# Patient Record
Sex: FEMALE | Race: BLACK OR AFRICAN AMERICAN | NOT HISPANIC OR LATINO | Employment: STUDENT | URBAN - METROPOLITAN AREA
[De-identification: names, ages, dates, MRNs, and addresses within clinical notes are randomized per-mention and may not be internally consistent; named-entity substitution may affect disease eponyms.]

---

## 2022-02-04 ENCOUNTER — HOSPITAL ENCOUNTER (EMERGENCY)
Facility: CLINIC | Age: 22
Discharge: LEFT BEFORE DISCHARGE | End: 2022-02-04

## 2022-02-04 VITALS
OXYGEN SATURATION: 99 % | WEIGHT: 158 LBS | SYSTOLIC BLOOD PRESSURE: 123 MMHG | BODY MASS INDEX: 29.83 KG/M2 | TEMPERATURE: 97.4 F | HEIGHT: 61 IN | RESPIRATION RATE: 16 BRPM | DIASTOLIC BLOOD PRESSURE: 80 MMHG | HEART RATE: 87 BPM

## 2022-02-04 ASSESSMENT — MIFFLIN-ST. JEOR: SCORE: 1424.06

## 2022-02-04 NOTE — ED TRIAGE NOTES
Before arriving home post procedure patient has had post surgical bleeding.  All 4 were removed.  Was done at the medical/surgical building in  [place].  [facility]  Was given Vicodin for pain.  Coming in for dizziness and continued bleeding.

## 2022-12-17 ENCOUNTER — HOSPITAL ENCOUNTER (EMERGENCY)
Facility: CLINIC | Age: 22
Discharge: HOME | End: 2022-12-17
Admitting: PHYSICIAN ASSISTANT
Payer: COMMERCIAL

## 2022-12-17 VITALS
OXYGEN SATURATION: 99 % | TEMPERATURE: 99.5 F | WEIGHT: 158 LBS | SYSTOLIC BLOOD PRESSURE: 119 MMHG | HEART RATE: 104 BPM | BODY MASS INDEX: 29.85 KG/M2 | DIASTOLIC BLOOD PRESSURE: 70 MMHG | RESPIRATION RATE: 18 BRPM

## 2022-12-17 DIAGNOSIS — U07.1 INFECTION DUE TO 2019 NOVEL CORONAVIRUS: ICD-10-CM

## 2022-12-17 LAB
FLUAV RNA SPEC QL NAA+PROBE: NEGATIVE
FLUBV RNA RESP QL NAA+PROBE: NEGATIVE
RSV RNA SPEC NAA+PROBE: NEGATIVE
SARS-COV-2 RNA RESP QL NAA+PROBE: POSITIVE

## 2022-12-17 PROCEDURE — C9803 HOPD COVID-19 SPEC COLLECT: HCPCS

## 2022-12-17 PROCEDURE — 87637 SARSCOV2&INF A&B&RSV AMP PRB: CPT | Performed by: EMERGENCY MEDICINE

## 2022-12-17 PROCEDURE — 99283 EMERGENCY DEPT VISIT LOW MDM: CPT | Mod: CS

## 2022-12-17 ASSESSMENT — ENCOUNTER SYMPTOMS
CHILLS: 1
DIARRHEA: 0
FEVER: 1
SINUS PAIN: 1
VOMITING: 0
SINUS PRESSURE: 1
SHORTNESS OF BREATH: 0
SORE THROAT: 1
MYALGIAS: 1
NAUSEA: 0
COUGH: 1
FATIGUE: 1

## 2022-12-17 ASSESSMENT — ACTIVITIES OF DAILY LIVING (ADL): ADLS_ACUITY_SCORE: 33

## 2022-12-17 NOTE — ED TRIAGE NOTES
Pt arrives with flu symptoms since yesterday. Sinus pain, low grade fever, body aches, and cough.     Triage Assessment     Row Name 12/17/22 1011       Triage Assessment (Adult)    Airway WDL WDL       Respiratory WDL    Respiratory WDL cough    Cough Frequency frequent    Cough Type loose       Skin Circulation/Temperature WDL    Skin Circulation/Temperature WDL WDL       Cardiac WDL    Cardiac WDL WDL       Peripheral/Neurovascular WDL    Peripheral Neurovascular WDL WDL       Cognitive/Neuro/Behavioral WDL    Cognitive/Neuro/Behavioral WDL WDL

## 2022-12-17 NOTE — ED PROVIDER NOTES
EMERGENCY DEPARTMENT ENCOUNTER      NAME: Sherri Daley  AGE: 21 year old female  YOB: 2000  MRN: [id number]  EVALUATION DATE & TIME: 12/17/2022 11:04 AM    [facility]    ED PROVIDER: Adeola Rivas PA-C      Chief Complaint   Patient presents with     Flu Symptoms         FINAL IMPRESSION:  1. Infection due to 2019 novel coronavirus          MEDICAL DECISION MAKING:    Pertinent Labs & Imaging studies reviewed. (See chart for details)  21 year old female with a pertinent history of alpha thalassemia, RAY, asthma presents to the Emergency Department for evaluation of cough, sinus congestion and pressure, low-grade fevers, body aches x 1 day.     Vitals reviewed and notable for low grade fever with expected tachycardia. Patient is non-toxic appearing and in no acute distress. On physical exam, lungs are CTAB, no wheezing. Nasal congestion. Differential diagnosis includes viral respiratory infection including COVID, influenza or other viral syndrome, pneumonia, or reactive airway disease.     COVID positive. Patient is comfortable on room air, saturating at 97% with no respiratory distress. CXR deferred at this time. Stressed the importance of self quarantining at home to avoid potential spread of infection and written instructions provided. Patient expresses understanding and discharged home in stable condition after discussing return precautions.       Medical Decision Making    History:    Supplemental history from: Documented in HPI, if applicable    External Record(s) reviewed: Documented in HPI, if applicable.    Work Up:    Chart documentation includes differential considered and any EKGs or imaging interpreted by provider.    In additional to work up documented, I considered the following work up: See chart documentation, if applicable.    External consultation:    Discussion of management with another provider: See chart documentation, if applicable    Complicating  factors:    Care impacted by chronic illness: Chronic Lung Disease- asthma    Care affected by social determinants of health: N/A    Disposition considerations: Discharge. No recommendations on prescription strength medication(s). N/A.      0 minutes of critical care time     ED COURSE:  11:14 AM I met with the patient, obtained history, performed an initial exam, and discussed options and plan for diagnostics and treatment here in the ED.  11:30 AM Patient discharged after being provided with extensive anticipatory guidance and given return precautions, importance of PCP follow-up emphasized.    At the conclusion of the encounter I discussed the results of all of the tests and the disposition. The questions were answered. The patient acknowledged understanding and was agreeable with the care plan.     MEDICATIONS GIVEN IN THE EMERGENCY:  Medications - No data to display    NEW PRESCRIPTIONS STARTED AT TODAY'S ER VISIT  New Prescriptions    No medications on file            =================================================================    HPI:    Patient information was obtained from: Patient    Use of Interpretor: N/A      Sherri Daley is a 21 year old female with a pertinent history of alpha thalassemia, RAY, asthma who presents to this ED via private vehicle for evaluation of flu symptoms.    Patient presenting with 1 day of cough, sinus congestion and pressure, low-grade fevers, body aches.  She denies any vomiting, diarrhea, chest pain, shortness of breath.  She has not taken anything for her symptoms.  She is fully vaccinated for COVID-19 however not influenza.  Her mother is sick with similar symptoms.    REVIEW OF SYSTEMS:  Review of Systems   Constitutional: Positive for chills, fatigue and fever.   HENT: Positive for congestion, sinus pressure, sinus pain and sore throat.    Respiratory: Positive for cough. Negative for shortness of breath.    Cardiovascular: Negative for chest pain.   Gastrointestinal:  Negative for diarrhea, nausea and vomiting.   Musculoskeletal: Positive for myalgias.   All other systems reviewed and are negative.      PAST MEDICAL HISTORY:  No past medical history on file.    PAST SURGICAL HISTORY:  No past surgical history on file.        CURRENT MEDICATIONS:    No current facility-administered medications for this encounter.  No current outpatient medications on file.      ALLERGIES:  Allergies   Allergen Reactions     Lavender Oil Itching     Lilac     Lactose Other (See Comments)     Stomach upset, diarrhea  Stomach upset, diarrhea         FAMILY HISTORY:  No family history on file.    SOCIAL HISTORY:   Social History     Socioeconomic History     Marital status: Single       VITALS:  Patient Vitals for the past 24 hrs:   BP Temp Temp src Pulse Resp SpO2 Weight   12/17/22 1009 130/72 100.1  F (37.8  C) Oral 116 19 97 % 71.7 kg (158 lb)       PHYSICAL EXAM  Constitutional: Well developed, Well nourished, NAD  HENT: Normocephalic, Atraumatic, Bilateral external ears normal, nasal congestion.  Neck: Supple, No stridor.  Eyes: Conjunctiva normal, No discharge.   Respiratory: Normal breath sounds, No respiratory distress, No wheezing, Speaks full sentences easily. No cough.  Cardiovascular: Normal heart rate, Regular rhythm, No murmurs, No rubs, No gallops. Chest wall nontender.  GI: non-distended.  Musculoskeletal: No edema. No cyanosis, No clubbing. Good range of motion in all major joints.   Integument: Warm, Dry, No erythema, No rash. No petechiae.  Neurologic: Alert & oriented x 3, Normal motor function, Normal sensory function, No focal deficits noted. Normal gait.  Psychiatric: Affect normal, Judgment normal, Mood normal. Cooperative.    LAB:  All pertinent labs reviewed and interpreted.  Recent Results (from the past 24 hour(s))   Symptomatic Influenza A/B & SARS-CoV2 (COVID-19) Virus PCR Multiplex Nasopharyngeal    Collection Time: 12/17/22 10:14 AM    Specimen: Nasopharyngeal; Swab    Result Value Ref Range    Influenza A PCR Negative Negative    Influenza B PCR Negative Negative    RSV PCR Negative Negative    SARS CoV2 PCR Positive (A) Negative         Adeola Rivas PA-C  Emergency Medicine  [facility]  12/17/2022     Adeola Rivas PA-C  12/17/22 1133

## 2022-12-22 ENCOUNTER — HOSPITAL ENCOUNTER (EMERGENCY)
Facility: CLINIC | Age: 22
Discharge: HOME | End: 2022-12-22
Attending: EMERGENCY MEDICINE | Admitting: EMERGENCY MEDICINE
Payer: COMMERCIAL

## 2022-12-22 VITALS
WEIGHT: 165 LBS | TEMPERATURE: 97 F | HEART RATE: 77 BPM | RESPIRATION RATE: 16 BRPM | OXYGEN SATURATION: 98 % | DIASTOLIC BLOOD PRESSURE: 76 MMHG | BODY MASS INDEX: 31.18 KG/M2 | SYSTOLIC BLOOD PRESSURE: 126 MMHG

## 2022-12-22 DIAGNOSIS — R23.3 EASY BRUISING: ICD-10-CM

## 2022-12-22 DIAGNOSIS — R04.0 EPISTAXIS: ICD-10-CM

## 2022-12-22 PROCEDURE — 30903 CONTROL OF NOSEBLEED: CPT | Mod: 50

## 2022-12-22 PROCEDURE — 99284 EMERGENCY DEPT VISIT MOD MDM: CPT | Mod: 25

## 2022-12-22 ASSESSMENT — ENCOUNTER SYMPTOMS
CHILLS: 0
DIAPHORESIS: 0
ABDOMINAL PAIN: 0
LIGHT-HEADEDNESS: 1
COUGH: 1
FEVER: 0
NAUSEA: 0

## 2022-12-22 ASSESSMENT — ACTIVITIES OF DAILY LIVING (ADL): ADLS_ACUITY_SCORE: 33

## 2022-12-22 NOTE — ED TRIAGE NOTES
Pt arrives to ED with c/o ongoing bloody nose since yesterday. Pt states it stopped yesterday and now started up again. Bleeding from both sides with clots. Pt tested positive for Covid on 12/17. Hx of chronic anemia.      Triage Assessment     Row Name 12/22/22 0805       Triage Assessment (Adult)    Airway WDL WDL       Respiratory WDL    Respiratory WDL X;cough    Cough Frequency frequent       Skin Circulation/Temperature WDL    Skin Circulation/Temperature WDL WDL       Cardiac WDL    Cardiac WDL WDL       Cognitive/Neuro/Behavioral WDL    Cognitive/Neuro/Behavioral WDL WDL

## 2022-12-22 NOTE — ED PROVIDER NOTES
EMERGENCY DEPARTMENT ENCOUNTER      NAME: Sherri Daley  AGE: 21 year old female  YOB: 2000  MRN: [id number]  EVALUATION DATE & TIME: 12/22/2022  8:09 AM    [facility]    ED PROVIDER: Alisson Thurman MD      Chief Complaint   Patient presents with     Epistaxis         FINAL IMPRESSION:  1. Epistaxis          ED COURSE & MEDICAL DECISION MAKING:    Pertinent Labs & Imaging studies reviewed. (See chart for details)  21 year old female presents to the Emergency Department for evaluation after an episode of epistaxis. Patient tested positive for COVID on December 17.  She has had continued cough and congestion.  Yesterday she had an episode of epistaxis which spontaneously resolved.  This morning around 730 she started to have a rebleed, this time it came from her bilateral nares and not just the right side.  She states that she previously had a significant nosebleed about 12 years ago that required chemical cautery.  She is not anticoagulated but states that she recently had an unprovoked bruise on the back of her thigh.  She was seen for this and it was recommended that she follow-up with her primary care provider for further work-up of this.  She states she does not have a primary care provider.  On exam, there does appear to be source of bleeding identified on both sides along the medial septum.  Chemical cautery was performed with silver nitrate on both sides.  Patient was observed for 45 minutes with no rebleed and was discharged home with precautions and supportive management status post epistaxis.    8:11 AM I met with the patient, obtained history, performed an initial exam, and discussed options and plan for diagnostics and treatment here in the ED. I saw the patient wearing an eye shield, surgical mask, gown, and gloves.   8:30 AM Conducted epistaxis management.  9:29 AM Re-evaulated patient and bleeding has completely stopped. Patient asked for a referral for a PCP for  further work-up of easy bruising. We discussed the plan for discharge and the patient is agreeable. Reviewed supportive cares, symptomatic treatment, outpatient follow up, and reasons to return to the Emergency Department. Patient to be discharged by ED RN.     At the conclusion of the encounter I discussed the results of all of the tests and the disposition. The questions were answered. The patient or family acknowledged understanding and was agreeable with the care plan.         MEDICATIONS GIVEN IN THE EMERGENCY:  Medications   phenylephrine (ELMIRA-SYNEPHRINE) 0.5 % spray 1 drop (1 drop Both Nostrils Not Given 12/22/22 0838)       NEW PRESCRIPTIONS STARTED AT TODAY'S ER VISIT  New Prescriptions    No medications on file          =================================================================    HPI    Patient information was obtained from: Patient     Use of : N/A         Sherri Daley is a 21 year old female with a pertinent history of alpha thalassemia, RAY, asthma who presents to this ED via wheelchair for evaluation of epistaxis.     Yesterday night (12/21/2022), patient endorsed epistaxis with blood coming out of her right nostril. Epistaxis stopped spontaneously. This morning at 7:30 AM, epistaxis started again but this time, blood came out from both nostrils. She has not been able to stop the bleeding and additionally notes that she is able to feel the blood in the back of her throat. She denies abdominal pain or nausea. Patient denies use of blood thinners or any other medications.     Of note, patient tested positive for COVID-19 on 12/17/2022. At present, patient still feels continued congestion, cough and lightheadedness. Patient denies fever, chills, diaphoresis, or any other complaints at this time.     REVIEW OF SYSTEMS   Review of Systems   Constitutional: Negative for chills, diaphoresis and fever.   HENT: Positive for congestion and nosebleeds (both nostrils) .    Respiratory: Positive  for cough.    Gastrointestinal: Negative for abdominal pain and nausea.   Neurological: Positive for light-headedness.   All other systems reviewed and are negative.       PAST MEDICAL HISTORY:  History reviewed. No pertinent past medical history.    PAST SURGICAL HISTORY:  History reviewed. No pertinent surgical history.        CURRENT MEDICATIONS:    No current outpatient medications on file.      ALLERGIES:  Allergies   Allergen Reactions     Lavender Oil Itching     Lilac     Lactose Other (See Comments)     Stomach upset, diarrhea  Stomach upset, diarrhea         FAMILY HISTORY:  History reviewed. No pertinent family history.    SOCIAL HISTORY:   Social History     Socioeconomic History     Marital status: Single       VITALS:  BP (!) 141/90   Pulse 78   Temp 97  F (36.1  C) (Temporal)   Resp 16   Wt 74.8 kg (165 lb)   LMP 12/07/2022   SpO2 99%   BMI 31.18 kg/m      PHYSICAL EXAM    Constitutional: Well developed, Well nourished, NAD  HENT: Normocephalic, Atraumatic, Bilateral external ears normal, Oropharynx normal, mucous membranes moist, Area of bleeding identified along the bilateral medial septum anteriorly with no active bleeding.  Neck- Normal range of motion, No tenderness, Supple, No stridor.  Eyes: PERRL, EOMI, Conjunctiva normal, No discharge.   Respiratory: Normal breath sounds, No respiratory distress  Cardiovascular: Normal heart rate, Regular rhythm  Musculoskeletal: No edema. Good range of motion in all major joints. No tenderness to palpation or major deformities noted.   Integument: Warm, Dry, No erythema, No rash  Neurologic: Alert & oriented x 3, Normal motor function, Normal sensory function, No focal deficits noted. Normal gait.   Psychiatric: Affect normal, Judgment normal, Mood normal.      LAB:  All pertinent labs reviewed and interpreted.       RADIOLOGY:  Reviewed all pertinent imaging. Please see official radiology report.  No orders to display       EKG:     None    PROCEDURES:   PROCEDURE: Epistaxis Management   INDICATIONS: Failure of epistaxis control with non-invasive management techniques.   PROCEDURE PROVIDER:  Dr. Thurman   SITE: ANTERIOR--right nares   MEDICATION: Silver nitrate   NOTE: Anterior Source:  The area was evaluated and cleared with nasal suction to locate source of bleeding.  The bleeding location was managed with silver nitrate.Following treatment the patient was observed and no significant bleeding was noted to recur.     COMPLICATIONS:  None       PROCEDURE: Epistaxis Management   INDICATIONS: Failure of epistaxis control with non-invasive management techniques.   PROCEDURE PROVIDER:  Dr. Thurman   SITE: ANTERIOR--left nares   MEDICATION: Silver nitrate   NOTE: Anterior Source:  The area was evaluated and cleared with nasal suction to locate source of bleeding.  The bleeding location was managed with silver nitrate.Following treatment the patient was observed and no significant bleeding was noted to recur.     COMPLICATIONS:  None       I, Debbie Lyman, am serving as a scribe to document services personally performed by Alisson Thurman, based on my observation and the provider's statements to me. I, Alisson Thurman MD, attest that Debbie Lyman is acting in a scribe capacity, has observed my performance of the services and has documented them in accordance with my direction.    Alisson Thurman MD  Emergency Medicine  [facility] EMERGENCY ROOM  [address] [address] [phone]     Alisson Thurman MD  12/22/22 7848

## 2023-01-18 ENCOUNTER — OFFICE VISIT (OUTPATIENT)
Dept: INTERNAL MEDICINE | Facility: CLINIC | Age: 23
End: 2023-01-18
Attending: EMERGENCY MEDICINE
Payer: COMMERCIAL

## 2023-01-18 VITALS
BODY MASS INDEX: 34.18 KG/M2 | WEIGHT: 174.1 LBS | SYSTOLIC BLOOD PRESSURE: 122 MMHG | DIASTOLIC BLOOD PRESSURE: 84 MMHG | OXYGEN SATURATION: 100 % | HEIGHT: 60 IN | HEART RATE: 80 BPM

## 2023-01-18 DIAGNOSIS — R04.0 EPISTAXIS: ICD-10-CM

## 2023-01-18 DIAGNOSIS — Z11.4 SCREENING FOR HIV (HUMAN IMMUNODEFICIENCY VIRUS): ICD-10-CM

## 2023-01-18 DIAGNOSIS — R23.3 EASY BRUISING: ICD-10-CM

## 2023-01-18 DIAGNOSIS — E61.1 IRON DEFICIENCY: Primary | ICD-10-CM

## 2023-01-18 DIAGNOSIS — Z23 ENCOUNTER FOR VACCINATION: ICD-10-CM

## 2023-01-18 DIAGNOSIS — E04.9 GOITER: ICD-10-CM

## 2023-01-18 DIAGNOSIS — Z11.59 NEED FOR HEPATITIS C SCREENING TEST: ICD-10-CM

## 2023-01-18 DIAGNOSIS — H91.93 HEARING PROBLEM OF BOTH EARS: ICD-10-CM

## 2023-01-18 PROBLEM — R71.8 MICROCYTOSIS: Status: ACTIVE | Noted: 2020-10-27

## 2023-01-18 PROBLEM — D57.3 SICKLE CELL TRAIT (H): Status: ACTIVE | Noted: 2020-12-04

## 2023-01-18 PROBLEM — D50.0 ANEMIA DUE TO CHRONIC BLOOD LOSS: Status: ACTIVE | Noted: 2020-12-21

## 2023-01-18 PROBLEM — R22.1 LOCALIZED SWELLING, MASS OR LUMP OF NECK: Status: ACTIVE | Noted: 2020-10-27

## 2023-01-18 PROBLEM — J45.30 MILD PERSISTENT ASTHMA WITHOUT COMPLICATION: Status: ACTIVE | Noted: 2020-10-23

## 2023-01-18 PROBLEM — D56.0 ALPHA-THALASSEMIA (H): Status: ACTIVE | Noted: 2020-12-04

## 2023-01-18 LAB
APTT PPP: 32 SECONDS (ref 22–38)
BASOPHILS # BLD AUTO: 0.1 10E3/UL (ref 0–0.2)
BASOPHILS NFR BLD AUTO: 1 %
CLOSURE TME COLL+EPINEP BLD: <60 SECONDS
EOSINOPHIL # BLD AUTO: 0.1 10E3/UL (ref 0–0.7)
EOSINOPHIL NFR BLD AUTO: 1 %
ERYTHROCYTE [DISTWIDTH] IN BLOOD BY AUTOMATED COUNT: 15.9 % (ref 10–15)
FERRITIN SERPL-MCNC: 24 NG/ML (ref 6–175)
HCT VFR BLD AUTO: 41.1 % (ref 35–47)
HGB BLD-MCNC: 12.9 G/DL (ref 11.7–15.7)
IMM GRANULOCYTES # BLD: 0 10E3/UL
IMM GRANULOCYTES NFR BLD: 0 %
INR PPP: 1.18 (ref 0.85–1.15)
IRON BINDING CAPACITY (ROCHE): 362 UG/DL (ref 240–430)
IRON SATN MFR SERPL: 28 % (ref 15–46)
IRON SERPL-MCNC: 103 UG/DL (ref 37–145)
LYMPHOCYTES # BLD AUTO: 2 10E3/UL (ref 0.8–5.3)
LYMPHOCYTES NFR BLD AUTO: 30 %
MCH RBC QN AUTO: 21.3 PG (ref 26.5–33)
MCHC RBC AUTO-ENTMCNC: 31.4 G/DL (ref 31.5–36.5)
MCV RBC AUTO: 68 FL (ref 78–100)
MONOCYTES # BLD AUTO: 0.5 10E3/UL (ref 0–1.3)
MONOCYTES NFR BLD AUTO: 8 %
NEUTROPHILS # BLD AUTO: 4.1 10E3/UL (ref 1.6–8.3)
NEUTROPHILS NFR BLD AUTO: 61 %
PLATELET # BLD AUTO: 282 10E3/UL (ref 150–450)
RBC # BLD AUTO: 6.05 10E6/UL (ref 3.8–5.2)
TSH SERPL DL<=0.005 MIU/L-ACNC: 2.09 UIU/ML (ref 0.3–4.2)
WBC # BLD AUTO: 6.8 10E3/UL (ref 4–11)

## 2023-01-18 PROCEDURE — 85025 COMPLETE CBC W/AUTO DIFF WBC: CPT | Performed by: INTERNAL MEDICINE

## 2023-01-18 PROCEDURE — 85246 CLOT FACTOR VIII VW ANTIGEN: CPT | Performed by: INTERNAL MEDICINE

## 2023-01-18 PROCEDURE — 87389 HIV-1 AG W/HIV-1&-2 AB AG IA: CPT | Performed by: INTERNAL MEDICINE

## 2023-01-18 PROCEDURE — 99203 OFFICE O/P NEW LOW 30 MIN: CPT | Mod: 25 | Performed by: INTERNAL MEDICINE

## 2023-01-18 PROCEDURE — 83550 IRON BINDING TEST: CPT | Performed by: INTERNAL MEDICINE

## 2023-01-18 PROCEDURE — 85240 CLOT FACTOR VIII AHG 1 STAGE: CPT | Performed by: INTERNAL MEDICINE

## 2023-01-18 PROCEDURE — 85245 CLOT FACTOR VIII VW RISTOCTN: CPT | Performed by: INTERNAL MEDICINE

## 2023-01-18 PROCEDURE — 85730 THROMBOPLASTIN TIME PARTIAL: CPT | Performed by: INTERNAL MEDICINE

## 2023-01-18 PROCEDURE — 90651 9VHPV VACCINE 2/3 DOSE IM: CPT | Performed by: INTERNAL MEDICINE

## 2023-01-18 PROCEDURE — 84443 ASSAY THYROID STIM HORMONE: CPT | Performed by: INTERNAL MEDICINE

## 2023-01-18 PROCEDURE — 90715 TDAP VACCINE 7 YRS/> IM: CPT | Performed by: INTERNAL MEDICINE

## 2023-01-18 PROCEDURE — 36415 COLL VENOUS BLD VENIPUNCTURE: CPT | Performed by: INTERNAL MEDICINE

## 2023-01-18 PROCEDURE — 82728 ASSAY OF FERRITIN: CPT | Performed by: INTERNAL MEDICINE

## 2023-01-18 PROCEDURE — 85576 BLOOD PLATELET AGGREGATION: CPT

## 2023-01-18 PROCEDURE — 85610 PROTHROMBIN TIME: CPT | Performed by: INTERNAL MEDICINE

## 2023-01-18 PROCEDURE — 90471 IMMUNIZATION ADMIN: CPT | Performed by: INTERNAL MEDICINE

## 2023-01-18 PROCEDURE — 83540 ASSAY OF IRON: CPT | Performed by: INTERNAL MEDICINE

## 2023-01-18 PROCEDURE — 86803 HEPATITIS C AB TEST: CPT | Performed by: INTERNAL MEDICINE

## 2023-01-18 PROCEDURE — 85390 FIBRINOLYSINS SCREEN I&R: CPT | Performed by: PATHOLOGY

## 2023-01-18 PROCEDURE — 90472 IMMUNIZATION ADMIN EACH ADD: CPT | Performed by: INTERNAL MEDICINE

## 2023-01-18 RX ORDER — BUDESONIDE AND FORMOTEROL FUMARATE DIHYDRATE 160; 4.5 UG/1; UG/1
AEROSOL RESPIRATORY (INHALATION)
COMMUNITY
Start: 2020-10-26

## 2023-01-18 ASSESSMENT — PATIENT HEALTH QUESTIONNAIRE - PHQ9
SUM OF ALL RESPONSES TO PHQ QUESTIONS 1-9: 19
SUM OF ALL RESPONSES TO PHQ QUESTIONS 1-9: 19
10. IF YOU CHECKED OFF ANY PROBLEMS, HOW DIFFICULT HAVE THESE PROBLEMS MADE IT FOR YOU TO DO YOUR WORK, TAKE CARE OF THINGS AT HOME, OR GET ALONG WITH OTHER PEOPLE: VERY DIFFICULT

## 2023-01-18 NOTE — LETTER
January 20, 2023      Sherri Daley  [address] [address]        Hi Sherri,     All of the special testing we did to look for a bleeding or clotting disorder were negative. Your iron levels are normal right now and you are no longer anemic. The low MCV seen on your CBC just shows us that you have the sickle cell trait and alpha thalassemia. Your thyroid function is normal. Your screening tests for HIV and hepatitis C were negative.     Dr. Ochoa       Resulted Orders   HIV Antigen Antibody Combo   Result Value Ref Range    HIV Antigen Antibody Combo Nonreactive Nonreactive      Comment:      HIV-1 p24 Ag & HIV-1/HIV-2 Ab Not Detected   Hepatitis C Screen Reflex to HCV RNA Quant and Genotype   Result Value Ref Range    Hepatitis C Antibody Nonreactive Nonreactive    Narrative    Assay performance characteristics have not been established for newborns, infants, and children.   INR   Result Value Ref Range    INR 1.18 (H) 0.85 - 1.15   Partial thromboplastin time   Result Value Ref Range    aPTT 32 22 - 38 Seconds   Platelet function closure with reflex   Result Value Ref Range    PFA-Col/Epi <60 <170 Seconds   Factor 8 assay   Result Value Ref Range    Factor 8 Assay 82 55 - 200 %   Von Willebrand antigen   Result Value Ref Range    von Willebrand Factor Antigen 108 50 - 200 %    Narrative    The presence of Rheumatoid Factor may produce an overestimation of the test result.   von Willebrand Factor Activity   Result Value Ref Range    von Willebrand Factor Activity 86 50 - 180 %   von Willebrand Interpretation   Result Value Ref Range    VONWILLEBRAND FACTOR INTERPRETATION       The von Willebrand factor antigen (VWF:Ag), von Willebrand factor activity (VWF:ACT), and Factor 8 levels are within normal limits.  The Factor 8 to VWF:Ag ratio and the VWF:Act to VWF:Ag ratio are within normal limits.    The diagnosis of von Willebrand disease can neither be established nor excluded on the basis of this  specimen.  If clinical suspicion is high for von Willebrand disease, recommend repeat testing in the first 3 days of the menstrual cycle, since the estrogen level influences the amount of circulating von Willebrand factor.  Note: Use of oral contraceptives and/or pregnancy could mask von Willebrand disease by elevating VWF:Ag and VWF:ACT levels to normal.  Family studies may also be helpful.    Jessy Wagner MD, PhD  [facility]                          Iron and iron binding capacity   Result Value Ref Range    Iron 103 37 - 145 ug/dL    Iron Binding Capacity 362 240 - 430 ug/dL    Iron Sat Index 28 15 - 46 %   Ferritin   Result Value Ref Range    Ferritin 24 6 - 175 ng/mL   TSH with free T4 reflex   Result Value Ref Range    TSH 2.09 0.30 - 4.20 uIU/mL   CBC with platelets and differential   Result Value Ref Range    WBC Count 6.8 4.0 - 11.0 10e3/uL    RBC Count 6.05 (H) 3.80 - 5.20 10e6/uL    Hemoglobin 12.9 11.7 - 15.7 g/dL    Hematocrit 41.1 35.0 - 47.0 %    MCV 68 (L) 78 - 100 fL    MCH 21.3 (L) 26.5 - 33.0 pg    MCHC 31.4 (L) 31.5 - 36.5 g/dL    RDW 15.9 (H) 10.0 - 15.0 %    Platelet Count 282 150 - 450 10e3/uL    % Neutrophils 61 %    % Lymphocytes 30 %    % Monocytes 8 %    % Eosinophils 1 %    % Basophils 1 %    % Immature Granulocytes 0 %    Absolute Neutrophils 4.1 1.6 - 8.3 10e3/uL    Absolute Lymphocytes 2.0 0.8 - 5.3 10e3/uL    Absolute Monocytes 0.5 0.0 - 1.3 10e3/uL    Absolute Eosinophils 0.1 0.0 - 0.7 10e3/uL    Absolute Basophils 0.1 0.0 - 0.2 10e3/uL    Absolute Immature Granulocytes 0.0 <=0.4 10e3/uL       If you have any questions or concerns, please call the clinic at the number listed above.       Sincerely,      Aisha Ohcoa MD

## 2023-01-18 NOTE — ASSESSMENT & PLAN NOTE
Patient reports issues with hearing over the last year.  She feels she has difficulty hearing while she is chewing and while in a crowd.  Sometimes has ringing in her years as well.  She tried Flonase but this has not been helpful.  On exam her ears and TMs are normal bilaterally, however nasal turbinates are quite erythematous and boggy.  - ENT referral  - Recommended daily flonase and saline rinses

## 2023-01-18 NOTE — ASSESSMENT & PLAN NOTE
Patient has had 2 episodes of epistaxis in her life that required chemical cautery, additionally is reporting easy mucocutaneous bleeding in her mouth.  Given this history we will evaluate for primary clotting disorders.  - CBC, PT, PTT, VWD screen

## 2023-01-18 NOTE — ASSESSMENT & PLAN NOTE
She has a known history of iron deficiency anemia, previously followed by hematology at [place].  Her last iron infusion was January 2021.  She does note fatigue coming back which is similar to what she felt when her iron was low previously.  She is not currently taking iron supplement.  - Check CBC, iron profile and ferritin   PROVIDER:[TOKEN:[[email or web address]]]

## 2023-01-18 NOTE — ASSESSMENT & PLAN NOTE
Patient reports tenderness on her lower neck, on exam has diffusely enlarged thyroid.  Additionally has been missing periods recently and is anxious.   - TSH  - Thyroid US

## 2023-01-18 NOTE — PROGRESS NOTES
Assessment & Plan   Problem List Items Addressed This Visit        Nervous and Auditory    Hearing problem of both ears     Patient reports issues with hearing over the last year.  She feels she has difficulty hearing while she is chewing and while in a crowd.  Sometimes has ringing in her years as well.  She tried Flonase but this has not been helpful.  On exam her ears and TMs are normal bilaterally, however nasal turbinates are quite erythematous and boggy.  - ENT referral  - Recommended daily flonase and saline rinses         Relevant Orders    Adult ENT  Referral       Respiratory    Epistaxis     Patient has had 2 episodes of epistaxis in her life that required chemical cautery, additionally is reporting easy mucocutaneous bleeding in her mouth.  Given this history we will evaluate for primary clotting disorders.  - CBC, PT, PTT, VWD screen          Relevant Orders    INR    Partial thromboplastin time    Platelet function closure with reflex (Completed)    Factor 8 assay    Von Willebrand antigen    von Willebrand Factor Activity    von Willebrand Interpretation       Digestive    Iron deficiency - Primary     She has a known history of iron deficiency anemia, previously followed by hematology at [place].  Her last iron infusion was January 2021.  She does note fatigue coming back which is similar to what she felt when her iron was low previously.  She is not currently taking iron supplement.  - Check CBC, iron profile and ferritin         Relevant Orders    CBC with platelets and differential (Completed)    Iron and iron binding capacity (Completed)    Ferritin (Completed)       Endocrine    Goiter     Patient reports tenderness on her lower neck, on exam has diffusely enlarged thyroid.  Additionally has been missing periods recently and is anxious.   - TSH  - Thyroid US         Relevant Orders    TSH with free T4 reflex (Completed)    US Thyroid   Other Visit Diagnoses     Easy bruising         Screening for HIV (human immunodeficiency virus)        Relevant Orders    HIV Antigen Antibody Combo    Need for hepatitis C screening test        Relevant Orders    Hepatitis C Screen Reflex to HCV RNA Quant and Genotype    Encounter for vaccination        Relevant Orders    TDAP VACCINE (Adacel, Boostrix) (Completed)    Human Papilloma Virus Vaccine (Gardasil 9) 3 Dose IM (Completed)            MED REC REQUIRED    Post Medication Reconciliation Status: discharge medications reconciled, continue medications without change  BMI:   Estimated body mass index is 34 kg/m  as calculated from the following:    Height as of this encounter: 1.524 m (5').    Weight as of this encounter: 79 kg (174 lb 1.6 oz).   Weight management plan: Discussed healthy diet and exercise guidelines    Depression Screening Follow Up    PHQ 1/18/2023   PHQ-9 Total Score 19   Q9: Thoughts of better off dead/self-harm past 2 weeks Several days   F/U: Thoughts of suicide or self-harm No   F/U: Safety concerns No       Return if symptoms worsen or fail to improve. - Patient is moving to [place] in 3 months.     Aisha Ochoa MD  [facility] TRAMAINEKATHY Polanco is a 22 year old presenting for the following health issues:  Follow Up ([facility] 12/22/2022. Now having neck soreness, heavy mucus feeling in the back of the throat, sharp tingling feeling in the throat area, check ears as well when she is chewing she cant hear people around her, cant hear people when she talking with people, was told she has fluid was given a ear drop before but this did not help and also a nasal spray) and clotting (Possible clotting disorder. Would like testing )    HPI     ED/UC Followup:  Facility:  [facility]  Date of visit: 12/22/2022  Reason for visit: Epistaxis  Current Status: one nose bleed since but got it to stop but now having different sx    Epistaxis/Bleeding:   One nose bleed when she was 9 that needed chemical  cautery. Then next one was just on 12/22, wouldn't stop at home, required cautery again in the ED. Will have bleeding with chips and other hard foods and pain. Had one hematoma on back of R thigh in early December as well, no preceding trauma. Some other easy bruising as well. Missing periods intermittently, denies heavy menstrual bleeding.  No family history of bleeding or clotting disorder.  Reports she was seen at another doctor's office in early December for the hematoma and was told she should consider getting tested for bleeding disorders.  Cannot find record of this visit in the chart.    Iron Deficiency  Per chart review, previously seen by hematology at [place] for microcytic anemia.  Was found to have iron deficiency, sickle cell trait and alpha thalassemia.  Has 2 gene deletion, 1 for chromosome. Not currently taking an iron supplement, did get iron infusions in 1/2021.  At that time her ferritin improved and his symptoms of low energy, restless legs, fatigue and pica symptoms improved.     Hearing: Can't hear when she is chewing and has trouble hearing what people say clearly. Hearing will go out, this started a year ago. Also has ringing in ears, started a while ago but happening more frequently. Used flonase but didn't help at all.     Trouble breathing: Going on for a while but also having mucous in her throat. Recently has had soreness to touch in her neck.  Per chart review had swelling in her lateral neck in the past she had an ultrasound and CT of her neck that were unremarkable.    Review of Systems   Constitutional, HEENT, cardiovascular, pulmonary, gi and gu systems are negative, except as otherwise noted.      Objective    /84 (BP Location: Right arm, Patient Position: Sitting, Cuff Size: Adult Regular)   Pulse 80   Ht 1.524 m (5')   Wt 79 kg (174 lb 1.6 oz)   LMP 12/07/2022   SpO2 100%   BMI 34.00 kg/m    Body mass index is 34 kg/m .  Physical Exam   GENERAL: healthy, alert and no  distress  EYES: Eyes grossly normal to inspection, PERRL and conjunctivae and sclerae normal  HENT: ear canals and TM's normal, Nasal turbinates erythematous and boggy bilaterally, mouth without ulcers or lesions  NECK: no adenopathy, difusely enlarged thyroid to palpation  RESP: lungs clear to auscultation - no rales, rhonchi or wheezes  CV: regular rate and rhythm, normal S1 S2, no S3 or S4, no murmur, click or rub, no peripheral edema and peripheral pulses strong  ABDOMEN: soft, nontender, no hepatosplenomegaly, no masses and bowel sounds normal  MS: no gross musculoskeletal defects noted, no edema  SKIN: no suspicious lesions or rashes on exposed skin   NEURO: Normal strength and tone, mentation intact and speech normal  PSYCH: mentation appears normal, affect normal/bright but anxious              Answers for HPI/ROS submitted by the patient on 1/18/2023  If you checked off any problems, how difficult have these problems made it for you to do your work, take care of things at home, or get along with other people?: Very difficult  PHQ9 TOTAL SCORE: 19

## 2023-01-19 LAB
FACT VIII ACT/NOR PPP: 82 % (ref 55–200)
HCV AB SERPL QL IA: NONREACTIVE
HIV 1+2 AB+HIV1 P24 AG SERPL QL IA: NONREACTIVE
VON WILLEBRAND EVAL PPP-IMP: NORMAL
VWF AG ACT/NOR PPP IA: 108 % (ref 50–200)
VWF:AC ACT/NOR PPP IA: 86 % (ref 50–180)

## 2023-01-20 ENCOUNTER — HOSPITAL ENCOUNTER (OUTPATIENT)
Dept: ULTRASOUND IMAGING | Facility: CLINIC | Age: 23
Discharge: HOME | End: 2023-01-20
Attending: INTERNAL MEDICINE | Admitting: INTERNAL MEDICINE
Payer: COMMERCIAL

## 2023-01-20 DIAGNOSIS — E04.9 GOITER: ICD-10-CM

## 2023-01-20 PROCEDURE — 76536 US EXAM OF HEAD AND NECK: CPT

## 2023-01-20 NOTE — RESULT ENCOUNTER NOTE
Please send letter with lab results and this message attached. Thanks!  --  Erlin Polanco,     All of the special testing we did to look for a bleeding or clotting disorder were negative. Your iron levels are normal right now and you are no longer anemic. The low MCV seen on your CBC just shows us that you have the sickle cell trait and alpha thalassemia. Your thyroid function is normal. Your screening tests for HIV and hepatitis C were negative.     Dr. Ochoa

## 2023-08-30 ENCOUNTER — OFFICE VISIT (OUTPATIENT)
Dept: FAMILY MEDICINE | Facility: CLINIC | Age: 23
End: 2023-08-30
Payer: MEDICAID

## 2023-08-30 VITALS
SYSTOLIC BLOOD PRESSURE: 115 MMHG | TEMPERATURE: 98 F | OXYGEN SATURATION: 99 % | DIASTOLIC BLOOD PRESSURE: 86 MMHG | WEIGHT: 190.31 LBS | HEART RATE: 82 BPM

## 2023-08-30 DIAGNOSIS — D50.8 OTHER IRON DEFICIENCY ANEMIA: ICD-10-CM

## 2023-08-30 DIAGNOSIS — F32.A ANXIETY AND DEPRESSION: ICD-10-CM

## 2023-08-30 DIAGNOSIS — Z76.89 ENCOUNTER TO ESTABLISH CARE: Primary | ICD-10-CM

## 2023-08-30 DIAGNOSIS — R09.82 POSTNASAL DRIP: ICD-10-CM

## 2023-08-30 DIAGNOSIS — J45.909 ASTHMA, UNSPECIFIED ASTHMA SEVERITY, UNSPECIFIED WHETHER COMPLICATED, UNSPECIFIED WHETHER PERSISTENT: ICD-10-CM

## 2023-08-30 DIAGNOSIS — F41.9 ANXIETY AND DEPRESSION: ICD-10-CM

## 2023-08-30 PROCEDURE — 99203 PR OFFICE/OUTPT VISIT, NEW, LEVL III, 30-44 MIN: ICD-10-PCS | Mod: ,,, | Performed by: NURSE PRACTITIONER

## 2023-08-30 PROCEDURE — 3079F PR MOST RECENT DIASTOLIC BLOOD PRESSURE 80-89 MM HG: ICD-10-PCS | Mod: CPTII,,, | Performed by: NURSE PRACTITIONER

## 2023-08-30 PROCEDURE — 3074F PR MOST RECENT SYSTOLIC BLOOD PRESSURE < 130 MM HG: ICD-10-PCS | Mod: CPTII,,, | Performed by: NURSE PRACTITIONER

## 2023-08-30 PROCEDURE — 3074F SYST BP LT 130 MM HG: CPT | Mod: CPTII,,, | Performed by: NURSE PRACTITIONER

## 2023-08-30 PROCEDURE — 3079F DIAST BP 80-89 MM HG: CPT | Mod: CPTII,,, | Performed by: NURSE PRACTITIONER

## 2023-08-30 PROCEDURE — 99203 OFFICE O/P NEW LOW 30 MIN: CPT | Mod: ,,, | Performed by: NURSE PRACTITIONER

## 2023-08-30 RX ORDER — FLUTICASONE PROPIONATE 50 MCG
1 SPRAY, SUSPENSION (ML) NASAL DAILY
Qty: 16 G | Refills: 1 | Status: SHIPPED | OUTPATIENT
Start: 2023-08-30

## 2023-08-30 RX ORDER — LORATADINE 10 MG/1
TABLET ORAL
COMMUNITY

## 2023-08-30 RX ORDER — FLUOXETINE 10 MG/1
10 CAPSULE ORAL DAILY
Qty: 30 CAPSULE | Refills: 1 | Status: SHIPPED | OUTPATIENT
Start: 2023-08-30 | End: 2024-08-29

## 2023-08-30 RX ORDER — CETIRIZINE HYDROCHLORIDE 5 MG/1
5 TABLET ORAL DAILY
Qty: 30 TABLET | Refills: 1 | Status: SHIPPED | OUTPATIENT
Start: 2023-08-30 | End: 2024-08-29

## 2023-08-30 RX ORDER — BUDESONIDE AND FORMOTEROL FUMARATE DIHYDRATE 160; 4.5 UG/1; UG/1
AEROSOL RESPIRATORY (INHALATION)
COMMUNITY

## 2023-08-30 NOTE — PROGRESS NOTES
SUBJECTIVE:     History of Present Illness      Chief Complaint: Medication Reaction (Iron pills make her sick ), Medication Refill (Asthma medication - patient isn't sure if she still needs to take her medication./Patient has questions about taking the medications prescribed to her .), Anxiety (Anxiety is a everyday thing ), and Depression (She says nearly every day she feels depression , she never been on medication )    HPI:  Patient is a 22 y.o. year old female who presents to clinic for establish care as a new patient.  Patient is present with her fiance.  Last PCP [place].  Patient reports history of asthma and anemia.  States she is been seeing a counselor for a little over a year for anxiety depression never been on medication.  Patient denies SI, HI hallucinations.  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    Today patient also complains of stuffy nose she states that he has been ongoing she feels like she has a constant drip in her throat.  Denies cough,    Review of Systems:    Review of Systems    12 point review of systems conducted, negative except as stated in the history of present illness. See HPI for details.     Previous History      Review of patient's allergies indicates:  No Known Allergies    Past Medical History:   Diagnosis Date    Anemia, unspecified     Asthma     Iron deficiency anemia, unspecified      Current Outpatient Medications   Medication Instructions    budesonide-formoterol 160-4.5 mcg (SYMBICORT) 160-4.5 mcg/actuation HFAA No dose, route, or frequency recorded.    cetirizine (ZYRTEC) 5 mg, Oral, Daily    FLUoxetine 10 mg, Oral, Daily    fluticasone propionate (FLONASE) 50 mcg, Each Nostril, Daily    iron,carbonyl/ascorbic acid (VITRON-C ORAL) No dose, route, or frequency recorded.    loratadine (CLARITIN) 10 mg tablet No dose,  route, or frequency recorded.     History reviewed. No pertinent surgical history.  Family History   Problem Relation Age of Onset    Diabetes Mother     Asthma Mother        Social History     Tobacco Use    Smoking status: Never    Smokeless tobacco: Never   Substance Use Topics    Alcohol use: Never    Drug use: Never        Health Maintenance      Health Maintenance   Topic Date Due    Hepatitis C Screening  Never done    Lipid Panel  Never done    HPV Vaccines (1 - 2-dose series) Never done    Chlamydia Screening  Never done    TETANUS VACCINE  Never done    Pap Smear  Never done       OBJECTIVE:     Physical Exam      Vital Signs Reviewed   Visit Vitals  /86 (BP Location: Right arm)   Pulse 82   Temp 98.1 °F (36.7 °C)   Wt 86.3 kg (190 lb 4.8 oz)   LMP 08/11/2023   SpO2 99%       Physical Exam    Physical Exam:  General: Alert, well nourished, no acute distress, non-toxic appearing.   Eyes: Anicteric sclera, without conjunctival injection, normal lids, no purulent drainage, EOMs grossly intact.   Ears: No tragal tenderness. Tympanic membranes intact, pearly grey, without effusion or erythema and with a positive light reflex.   Mouth: Posterior pharynx without erythema. No exudate, ulcerations, or lesion. No tonsillar swelling.   Neck: Supple, full ROM, no rigidity, no cervical adenopathy.   Cardio: Normal rate and rhythm    Resp: Respirations even and unlabored, clear to auscultation bilaterally.   Abd: No ecchymosis or distension. Normal bowel sounds in all 4 quadrants. No tenderness to palpation. No rebound tenderness or guarding. No CVA tenderness.   Skin: No rashes or open lesions noted.   MSK: No swelling. No abrasions or signs of trauma. Ambulating without assistance.   Neuro: Alert,oriented No focal deficits noted. Facial expressions even.   Psych: Cooperative, Normal affect      Procedures    Procedures     Labs   No results found for this or any previous visit.    Chemistry:  No results found  "for: "NA", "K", "CHLORIDE", "BUN", "CREATININE", "EGFRNORACEVR", "GLUCOSE", "CALCIUM", "ALKPHOS", "LABPROT", "ALBUMIN", "BILIDIR", "IBILI", "AST", "ALT", "MG", "PHOS", "[id number]", "TSH", "[id number]", "PSA"     No results found for: "HGBA1C", "MICROALBCREA"     Hematology:  No results found for: "WBC", "HGB", "HCT", "PLT"    Lipid Panel:  No results found for: "CHOL", "HDL", "LDL", "TRIG", "TOTALCHOLEST"     Urine:  No results found for: "COLORUA", "APPEARANCEUA", "SGUA", "PHUA", "PROTEINUA", "GLUCOSEUA", "KETONESUA", "BLOODUA", "NITRITESUA", "LEUKOCYTESUR", "RBCUA", "WBCUA", "BACTERIA", "SQEPUA", "HYALINECASTS", "CREATRANDUR", "PROTEINURINE", "UPROTCREA"      Assessment            ICD-10-CM ICD-9-CM   1. Encounter to establish care  Z76.89 V65.8   2. Postnasal drip  R09.82 784.91   3. Anxiety and depression  F41.9 300.00    F32.A 311   4. Asthma, unspecified asthma severity, unspecified whether complicated, unspecified whether persistent  J45.909 493.90   5. Other iron deficiency anemia  D50.8 280.8       Plan       1. Encounter to establish care  - fluticasone propionate (FLONASE) 50 mcg/actuation nasal spray; 1 spray (50 mcg total) by Each Nostril route once daily.  Dispense: 16 g; Refill: 1  - cetirizine (ZYRTEC) 5 MG tablet; Take 1 tablet (5 mg total) by mouth once daily.  Dispense: 30 tablet; Refill: 1  - FLUoxetine 10 MG capsule; Take 1 capsule (10 mg total) by mouth once daily.  Dispense: 30 capsule; Refill: 1  - CBC Auto Differential; Future  - Comprehensive Metabolic Panel; Future  - Lipid Panel; Future  - TSH; Future  - Hemoglobin A1C; Future  - Urinalysis; Future  - Ferritin; Future  - Iron and TIBC; Future  - Urinalysis    2. Postnasal drip  - fluticasone propionate (FLONASE) 50 mcg/actuation nasal spray; 1 spray (50 mcg total) by Each Nostril route once daily.  Dispense: 16 g; Refill: 1  - cetirizine (ZYRTEC) 5 MG tablet; Take 1 tablet (5 mg total) by mouth once daily.  Dispense: 30 tablet; " Refill: 1    3. Anxiety and depression  - FLUoxetine 10 MG capsule; Take 1 capsule (10 mg total) by mouth once daily.  Dispense: 30 capsule; Refill: 1    4. Asthma, unspecified asthma severity, unspecified whether complicated, unspecified whether persistent  Stable continue inhaler  5. Other iron deficiency anemia  - Ferritin; Future  - Iron and TIBC; Future    Orders Placed This Encounter    CBC Auto Differential    Comprehensive Metabolic Panel    Lipid Panel    TSH    Hemoglobin A1C    Urinalysis    Ferritin    Iron and TIBC    fluticasone propionate (FLONASE) 50 mcg/actuation nasal spray    cetirizine (ZYRTEC) 5 MG tablet    FLUoxetine 10 MG capsule      Medication List with Changes/Refills   New Medications    CETIRIZINE (ZYRTEC) 5 MG TABLET    Take 1 tablet (5 mg total) by mouth once daily.    FLUOXETINE 10 MG CAPSULE    Take 1 capsule (10 mg total) by mouth once daily.    FLUTICASONE PROPIONATE (FLONASE) 50 MCG/ACTUATION NASAL SPRAY    1 spray (50 mcg total) by Each Nostril route once daily.   Current Medications    BUDESONIDE-FORMOTEROL 160-4.5 MCG (SYMBICORT) 160-4.5 MCG/ACTUATION HFAA        IRON,CARBONYL/ASCORBIC ACID (VITRON-C ORAL)        LORATADINE (CLARITIN) 10 MG TABLET           Follow up in about 4 weeks (around 9/27/2023) for Wellness, LABS Prior.   Follow up in about 4 weeks (around 9/27/2023) for Wellness, LABS Prior. In addition to their scheduled follow up, the patient has also been instructed to follow up on as needed basis.   Future Appointments   Date Time Provider Department Center   10/2/2023  9:30 AM Kong Bautista FNP [facility]

## 2023-09-25 ENCOUNTER — TELEPHONE (OUTPATIENT)
Dept: FAMILY MEDICINE | Facility: CLINIC | Age: 23
End: 2023-09-25

## 2023-09-25 NOTE — PROGRESS NOTES
SUBJECTIVE:     History of Present Illness      Chief Complaint: Annual Exam    HPI:  Patient is a 22 y.o. year old female who presents to clinic for annual wellness and lab review.  The patient's general health status informed as good.  Today patient states that she feels like she is been having some difficulty hearing.  For ringing in her ears.  Patient reports that she is had similar episodes like this in the past    Review of Systems:    Review of Systems    12 point review of systems conducted, negative except as stated in the history of present illness. See HPI for details.     Previous History      Review of patient's allergies indicates:   Allergen Reactions    Lavender Hives, Itching and Shortness Of Breath    Cat dander Hives and Itching       Past Medical History:   Diagnosis Date    Anemia, unspecified     Asthma     Iron deficiency anemia, unspecified      Current Outpatient Medications   Medication Instructions    budesonide-formoterol 160-4.5 mcg (SYMBICORT) 160-4.5 mcg/actuation HFAA No dose, route, or frequency recorded.    cetirizine (ZYRTEC) 5 mg, Oral, Daily    FLUoxetine 10 mg, Oral, Daily    fluticasone propionate (FLONASE) 50 mcg, Each Nostril, Daily    iron,carbonyl/ascorbic acid (VITRON-C ORAL) No dose, route, or frequency recorded.    loratadine (CLARITIN) 10 mg tablet No dose, route, or frequency recorded.     History reviewed. No pertinent surgical history.  Family History   Problem Relation Age of Onset    Diabetes Mother     Asthma Mother        Social History     Tobacco Use    Smoking status: Never    Smokeless tobacco: Never   Substance Use Topics    Alcohol use: Never    Drug use: Never        Health Maintenance      Health Maintenance   Topic Date Due    Hepatitis C Screening  Never done    HPV Vaccines (1 - 2-dose series) Never done    Chlamydia Screening  Never done    TETANUS VACCINE  Never done    Pap Smear  Never done    Lipid Panel  Completed       OBJECTIVE:     Physical  Exam      Vital Signs Reviewed   Visit Vitals  /85 (BP Location: Left arm)   Pulse 84   Temp 97.9 °F (36.6 °C)   Wt 87.6 kg (193 lb 3.2 oz)   LMP 07/17/2023   SpO2 100%       Physical Exam    Physical Exam:  General: Alert, well nourished, no acute distress, non-toxic appearing.   Eyes: Anicteric sclera, without conjunctival injection, normal lids, no purulent drainage, EOMs grossly intact.   Ears: No tragal tenderness. Tympanic membranes intact, pearly grey, without effusion or erythema and with a positive light reflex.   Mouth: Posterior pharynx without erythema. No exudate, ulcerations, or lesion. No tonsillar swelling.   Neck: Supple, full ROM, no rigidity, no cervical adenopathy.   Cardio: Normal rate and rhythm    Resp: Respirations even and unlabored, clear to auscultation bilaterally.   Abd: No ecchymosis or distension. Normal bowel sounds in all 4 quadrants. No tenderness to palpation. No rebound tenderness or guarding. No CVA tenderness.   Skin: No rashes or open lesions noted.   MSK: No swelling. No abrasions or signs of trauma. Ambulating without assistance.   Neuro: Alert,oriented No focal deficits noted. Facial expressions even.   Psych: Cooperative, Normal affect      Procedures    Procedures     Labs     Results for orders placed or performed in visit on 09/30/23   Comprehensive Metabolic Panel   Result Value Ref Range    Sodium Level 135 (L) 136 - 145 mmol/L    Potassium Level 4.2 3.5 - 5.1 mmol/L    Chloride 102 98 - 107 mmol/L    Carbon Dioxide 25 22 - 29 mmol/L    Glucose Level 85 74 - 100 mg/dL    Blood Urea Nitrogen 10.0 7.0 - 18.7 mg/dL    Creatinine 0.83 0.55 - 1.02 mg/dL    Calcium Level Total 9.3 8.4 - 10.2 mg/dL    Protein Total 8.5 (H) 6.4 - 8.3 gm/dL    Albumin Level 4.1 3.5 - 5.0 g/dL    Globulin 4.4 (H) 2.4 - 3.5 gm/dL    Albumin/Globulin Ratio 0.9 (L) 1.1 - 2.0 ratio    Bilirubin Total 0.4 <=1.5 mg/dL    Alkaline Phosphatase 89 40 - 150 unit/L    Alanine Aminotransferase 16 0 -  55 unit/L    Aspartate Aminotransferase 23 5 - 34 unit/L    eGFR >60 mls/min/1.73/m2   Lipid Panel   Result Value Ref Range    Cholesterol Total 171 <=200 mg/dL    HDL Cholesterol 45 35 - 60 mg/dL    Triglyceride 54 37 - 140 mg/dL    Cholesterol/HDL Ratio 4 0 - 5    Very Low Density Lipoprotein 11     LDL Cholesterol 115.00 50.00 - 140.00 mg/dL   TSH   Result Value Ref Range    TSH 1.344 0.350 - 4.940 uIU/mL   Hemoglobin A1C   Result Value Ref Range    Hemoglobin A1c 5.2 <=7.0 %    Estimated Average Glucose 102.5 mg/dL   Ferritin   Result Value Ref Range    Ferritin Level 9.20 4.63 - 204.00 ng/mL   Iron and TIBC   Result Value Ref Range    Iron Binding Capacity Unsaturated 307 70 - 310 ug/dL    Iron Level 50 50 - 170 ug/dL    Transferrin 325 180 - 382 mg/dL    Iron Binding Capacity Total 357 250 - 450 ug/dL    Iron Saturation 14 (L) 20 - 50 %   CBC with Differential   Result Value Ref Range    WBC 6.77 4.50 - 11.50 x10(3)/mcL    RBC 6.25 (H) 4.20 - 5.40 x10(6)/mcL    Hgb 12.9 12.0 - 16.0 g/dL    Hct 43.3 37.0 - 47.0 %    MCV 69.3 (L) 80.0 - 94.0 fL    MCH 20.6 (L) 27.0 - 31.0 pg    MCHC 29.8 (L) 33.0 - 36.0 g/dL    RDW 16.8 11.5 - 17.0 %    Platelet 317 130 - 400 x10(3)/mcL    MPV 10.7 (H) 7.4 - 10.4 fL    Neut % 59.2 %    Lymph % 30.9 %    Mono % 8.4 %    Eos % 0.7 %    Basophil % 0.7 %    Lymph # 2.09 0.6 - 4.6 x10(3)/mcL    Neut # 4.00 2.1 - 9.2 x10(3)/mcL    Mono # 0.57 0.1 - 1.3 x10(3)/mcL    Eos # 0.05 0 - 0.9 x10(3)/mcL    Baso # 0.05 <=0.2 x10(3)/mcL    IG# 0.01 0 - 0.04 x10(3)/mcL    IG% 0.1 %       Chemistry:  Lab Results   Component Value Date     (L) 09/30/2023    K 4.2 09/30/2023    CHLORIDE 102 09/30/2023    BUN 10.0 09/30/2023    CREATININE 0.83 09/30/2023    EGFRNORACEVR >60 09/30/2023    GLUCOSE 85 09/30/2023    CALCIUM 9.3 09/30/2023    ALKPHOS 89 09/30/2023    LABPROT 8.5 (H) 09/30/2023    ALBUMIN 4.1 09/30/2023    AST 23 09/30/2023    ALT 16 09/30/2023    TSH 1.344 09/30/2023        Lab  Results   Component Value Date    HGBA1C 5.2 09/30/2023        Hematology:  Lab Results   Component Value Date    WBC 6.77 09/30/2023    HGB 12.9 09/30/2023    HCT 43.3 09/30/2023     09/30/2023       Lipid Panel:  Lab Results   Component Value Date    CHOL 171 09/30/2023    HDL 45 09/30/2023    .00 09/30/2023    TRIG 54 09/30/2023    TOTALCHOLEST 4 09/30/2023        Urine:  Lab Results   Component Value Date    COLORUA Yellow 09/30/2023    APPEARANCEUA Clear 09/30/2023    SGUA 1.020 09/30/2023    PHUA 7.0 09/30/2023    PROTEINUA Negative 09/30/2023    GLUCOSEUA Negative 09/30/2023    KETONESUA Negative 09/30/2023    BLOODUA Trace-Intact (A) 09/30/2023    NITRITESUA Negative 09/30/2023    LEUKOCYTESUR Negative 09/30/2023    RBCUA None Seen 09/30/2023    WBCUA None Seen 09/30/2023    BACTERIA None Seen 09/30/2023         Assessment            ICD-10-CM ICD-9-CM   1. Wellness examination  Z00.00 V70.0   2. Anxiety and depression  F41.9 300.00    F32.A 311   3. Postnasal drip  R09.82 784.91   4. Other iron deficiency anemia  D50.8 280.8   5. Cervical cancer screening  Z12.4 V76.2   6. Screen for STD (sexually transmitted disease)  Z11.3 V74.5       Plan       1. Wellness examination  Discussed labs and preventative screenings   Overall health status reviewed.    Significant chronic conditions addressed, including ongoing treatment plans.   Good health habits reinforced.    Cardiovascular disease risk factors discussed.   Appropriate recommendations and preventative care medical   information provided with annual wellness exams encouraged.  Vaccination status   Mammo  Colon  PSA  Cervical     2. Anxiety and depression    3. Postnasal drip    4. Other iron deficiency anemia    5. Cervical cancer screening  - Ambulatory referral/consult to Obstetrics / Gynecology; Future    6. Screen for STD (sexually transmitted disease)  - POCT urine pregnancy  - HIV 1/2 Ag/Ab (4th Gen); Future  - HSV 1 & 2, IgG; Future  -  Chlamydia/GC, PCR    Orders Placed This Encounter    Chlamydia/GC, PCR    HIV 1/2 Ag/Ab (4th Gen)    HSV 1 & 2, IgG    Ambulatory referral/consult to Obstetrics / Gynecology    POCT urine pregnancy      Medication List with Changes/Refills   Current Medications    BUDESONIDE-FORMOTEROL 160-4.5 MCG (SYMBICORT) 160-4.5 MCG/ACTUATION HFAA        CETIRIZINE (ZYRTEC) 5 MG TABLET    Take 1 tablet (5 mg total) by mouth once daily.    FLUOXETINE 10 MG CAPSULE    Take 1 capsule (10 mg total) by mouth once daily.    FLUTICASONE PROPIONATE (FLONASE) 50 MCG/ACTUATION NASAL SPRAY    1 spray (50 mcg total) by Each Nostril route once daily.    IRON,CARBONYL/ASCORBIC ACID (VITRON-C ORAL)        LORATADINE (CLARITIN) 10 MG TABLET           Follow up in about 2 months (around 12/2/2023).   Follow up in about 2 months (around 12/2/2023). In addition to their scheduled follow up, the patient has also been instructed to follow up on as needed basis.   Future Appointments   Date Time Provider Department Center   12/4/2023  9:00 AM Kong Bautista FNP [facility] [place]    10/8/2024  9:00 [facility]  [place]

## 2023-09-30 ENCOUNTER — LAB VISIT (OUTPATIENT)
Dept: LAB | Facility: HOSPITAL | Age: 23
End: 2023-09-30
Attending: NURSE PRACTITIONER
Payer: MEDICAID

## 2023-09-30 DIAGNOSIS — D50.8 OTHER IRON DEFICIENCY ANEMIA: ICD-10-CM

## 2023-09-30 DIAGNOSIS — Z76.89 ENCOUNTER TO ESTABLISH CARE: ICD-10-CM

## 2023-09-30 LAB
ALBUMIN SERPL-MCNC: 4.1 G/DL (ref 3.5–5)
ALBUMIN/GLOB SERPL: 0.9 RATIO (ref 1.1–2)
ALP SERPL-CCNC: 89 UNIT/L (ref 40–150)
ALT SERPL-CCNC: 16 UNIT/L (ref 0–55)
APPEARANCE UR: CLEAR
AST SERPL-CCNC: 23 UNIT/L (ref 5–34)
BACTERIA #/AREA URNS AUTO: NORMAL /HPF
BASOPHILS # BLD AUTO: 0.05 X10(3)/MCL
BASOPHILS NFR BLD AUTO: 0.7 %
BILIRUB SERPL-MCNC: 0.4 MG/DL
BILIRUB UR QL STRIP.AUTO: NEGATIVE
BUN SERPL-MCNC: 10 MG/DL (ref 7–18.7)
CALCIUM SERPL-MCNC: 9.3 MG/DL (ref 8.4–10.2)
CHLORIDE SERPL-SCNC: 102 MMOL/L (ref 98–107)
CHOLEST SERPL-MCNC: 171 MG/DL
CHOLEST/HDLC SERPL: 4 {RATIO} (ref 0–5)
CO2 SERPL-SCNC: 25 MMOL/L (ref 22–29)
COLOR UR AUTO: YELLOW
CREAT SERPL-MCNC: 0.83 MG/DL (ref 0.55–1.02)
EOSINOPHIL # BLD AUTO: 0.05 X10(3)/MCL (ref 0–0.9)
EOSINOPHIL NFR BLD AUTO: 0.7 %
ERYTHROCYTE [DISTWIDTH] IN BLOOD BY AUTOMATED COUNT: 16.8 % (ref 11.5–17)
EST. AVERAGE GLUCOSE BLD GHB EST-MCNC: 102.5 MG/DL
FERRITIN SERPL-MCNC: 9.2 NG/ML (ref 4.63–204)
GFR SERPLBLD CREATININE-BSD FMLA CKD-EPI: >60 MLS/MIN/1.73/M2
GLOBULIN SER-MCNC: 4.4 GM/DL (ref 2.4–3.5)
GLUCOSE SERPL-MCNC: 85 MG/DL (ref 74–100)
GLUCOSE UR QL STRIP.AUTO: NEGATIVE
HBA1C MFR BLD: 5.2 %
HCT VFR BLD AUTO: 43.3 % (ref 37–47)
HDLC SERPL-MCNC: 45 MG/DL (ref 35–60)
HGB BLD-MCNC: 12.9 G/DL (ref 12–16)
IMM GRANULOCYTES # BLD AUTO: 0.01 X10(3)/MCL (ref 0–0.04)
IMM GRANULOCYTES NFR BLD AUTO: 0.1 %
IRON SATN MFR SERPL: 14 % (ref 20–50)
IRON SERPL-MCNC: 50 UG/DL (ref 50–170)
KETONES UR QL STRIP.AUTO: NEGATIVE
LDLC SERPL CALC-MCNC: 115 MG/DL (ref 50–140)
LEUKOCYTE ESTERASE UR QL STRIP.AUTO: NEGATIVE
LYMPHOCYTES # BLD AUTO: 2.09 X10(3)/MCL (ref 0.6–4.6)
LYMPHOCYTES NFR BLD AUTO: 30.9 %
MCH RBC QN AUTO: 20.6 PG (ref 27–31)
MCHC RBC AUTO-ENTMCNC: 29.8 G/DL (ref 33–36)
MCV RBC AUTO: 69.3 FL (ref 80–94)
MONOCYTES # BLD AUTO: 0.57 X10(3)/MCL (ref 0.1–1.3)
MONOCYTES NFR BLD AUTO: 8.4 %
NEUTROPHILS # BLD AUTO: 4 X10(3)/MCL (ref 2.1–9.2)
NEUTROPHILS NFR BLD AUTO: 59.2 %
NITRITE UR QL STRIP.AUTO: NEGATIVE
PH UR STRIP.AUTO: 7 [PH]
PLATELET # BLD AUTO: 317 X10(3)/MCL (ref 130–400)
PMV BLD AUTO: 10.7 FL (ref 7.4–10.4)
POTASSIUM SERPL-SCNC: 4.2 MMOL/L (ref 3.5–5.1)
PROT SERPL-MCNC: 8.5 GM/DL (ref 6.4–8.3)
PROT UR QL STRIP.AUTO: NEGATIVE
RBC # BLD AUTO: 6.25 X10(6)/MCL (ref 4.2–5.4)
RBC #/AREA URNS AUTO: NORMAL /HPF
RBC UR QL AUTO: ABNORMAL
SODIUM SERPL-SCNC: 135 MMOL/L (ref 136–145)
SP GR UR STRIP.AUTO: 1.02 (ref 1–1.03)
SQUAMOUS #/AREA URNS AUTO: NORMAL /HPF
TIBC SERPL-MCNC: 307 UG/DL (ref 70–310)
TIBC SERPL-MCNC: 357 UG/DL (ref 250–450)
TRANSFERRIN SERPL-MCNC: 325 MG/DL (ref 180–382)
TRIGL SERPL-MCNC: 54 MG/DL (ref 37–140)
TSH SERPL-ACNC: 1.34 UIU/ML (ref 0.35–4.94)
UROBILINOGEN UR STRIP-ACNC: 0.2
VLDLC SERPL CALC-MCNC: 11 MG/DL
WBC # SPEC AUTO: 6.77 X10(3)/MCL (ref 4.5–11.5)
WBC #/AREA URNS AUTO: NORMAL /HPF

## 2023-09-30 PROCEDURE — 82728 ASSAY OF FERRITIN: CPT

## 2023-09-30 PROCEDURE — 85025 COMPLETE CBC W/AUTO DIFF WBC: CPT

## 2023-09-30 PROCEDURE — 36415 COLL VENOUS BLD VENIPUNCTURE: CPT

## 2023-09-30 PROCEDURE — 83036 HEMOGLOBIN GLYCOSYLATED A1C: CPT

## 2023-09-30 PROCEDURE — 84443 ASSAY THYROID STIM HORMONE: CPT

## 2023-09-30 PROCEDURE — 83550 IRON BINDING TEST: CPT

## 2023-09-30 PROCEDURE — 80053 COMPREHEN METABOLIC PANEL: CPT

## 2023-09-30 PROCEDURE — 80061 LIPID PANEL: CPT

## 2023-10-02 ENCOUNTER — OFFICE VISIT (OUTPATIENT)
Dept: FAMILY MEDICINE | Facility: CLINIC | Age: 23
End: 2023-10-02
Payer: MEDICAID

## 2023-10-02 ENCOUNTER — PATIENT OUTREACH (OUTPATIENT)
Dept: ADMINISTRATIVE | Facility: HOSPITAL | Age: 23
End: 2023-10-02
Payer: MEDICAID

## 2023-10-02 ENCOUNTER — LAB VISIT (OUTPATIENT)
Dept: LAB | Facility: HOSPITAL | Age: 23
End: 2023-10-02
Attending: NURSE PRACTITIONER
Payer: MEDICAID

## 2023-10-02 VITALS
HEART RATE: 84 BPM | OXYGEN SATURATION: 100 % | DIASTOLIC BLOOD PRESSURE: 85 MMHG | TEMPERATURE: 98 F | SYSTOLIC BLOOD PRESSURE: 120 MMHG | WEIGHT: 193.19 LBS

## 2023-10-02 DIAGNOSIS — Z00.00 WELLNESS EXAMINATION: Primary | ICD-10-CM

## 2023-10-02 DIAGNOSIS — Z11.3 SCREEN FOR STD (SEXUALLY TRANSMITTED DISEASE): ICD-10-CM

## 2023-10-02 DIAGNOSIS — D50.8 OTHER IRON DEFICIENCY ANEMIA: ICD-10-CM

## 2023-10-02 DIAGNOSIS — R09.82 POSTNASAL DRIP: ICD-10-CM

## 2023-10-02 DIAGNOSIS — F41.9 ANXIETY AND DEPRESSION: ICD-10-CM

## 2023-10-02 DIAGNOSIS — Z12.4 CERVICAL CANCER SCREENING: ICD-10-CM

## 2023-10-02 DIAGNOSIS — F32.A ANXIETY AND DEPRESSION: ICD-10-CM

## 2023-10-02 DIAGNOSIS — H91.90 DECREASED HEARING, UNSPECIFIED LATERALITY: ICD-10-CM

## 2023-10-02 LAB
C TRACH DNA SPEC QL NAA+PROBE: NOT DETECTED
HIV 1+2 AB+HIV1 P24 AG SERPL QL IA: NONREACTIVE
N GONORRHOEA DNA SPEC QL NAA+PROBE: NOT DETECTED
SOURCE (OHS): NORMAL

## 2023-10-02 PROCEDURE — 3044F PR MOST RECENT HEMOGLOBIN A1C LEVEL <7.0%: ICD-10-PCS | Mod: CPTII,,, | Performed by: NURSE PRACTITIONER

## 2023-10-02 PROCEDURE — 3079F PR MOST RECENT DIASTOLIC BLOOD PRESSURE 80-89 MM HG: ICD-10-PCS | Mod: CPTII,,, | Performed by: NURSE PRACTITIONER

## 2023-10-02 PROCEDURE — 3079F DIAST BP 80-89 MM HG: CPT | Mod: CPTII,,, | Performed by: NURSE PRACTITIONER

## 2023-10-02 PROCEDURE — 86696 HERPES SIMPLEX TYPE 2 TEST: CPT

## 2023-10-02 PROCEDURE — 36415 COLL VENOUS BLD VENIPUNCTURE: CPT

## 2023-10-02 PROCEDURE — 1159F PR MEDICATION LIST DOCUMENTED IN MEDICAL RECORD: ICD-10-PCS | Mod: CPTII,,, | Performed by: NURSE PRACTITIONER

## 2023-10-02 PROCEDURE — 99395 PR PREVENTIVE VISIT,EST,18-39: ICD-10-PCS | Mod: ,,, | Performed by: NURSE PRACTITIONER

## 2023-10-02 PROCEDURE — 87389 HIV-1 AG W/HIV-1&-2 AB AG IA: CPT

## 2023-10-02 PROCEDURE — 3044F HG A1C LEVEL LT 7.0%: CPT | Mod: CPTII,,, | Performed by: NURSE PRACTITIONER

## 2023-10-02 PROCEDURE — 99395 PREV VISIT EST AGE 18-39: CPT | Mod: ,,, | Performed by: NURSE PRACTITIONER

## 2023-10-02 PROCEDURE — 3074F SYST BP LT 130 MM HG: CPT | Mod: CPTII,,, | Performed by: NURSE PRACTITIONER

## 2023-10-02 PROCEDURE — 3074F PR MOST RECENT SYSTOLIC BLOOD PRESSURE < 130 MM HG: ICD-10-PCS | Mod: CPTII,,, | Performed by: NURSE PRACTITIONER

## 2023-10-02 PROCEDURE — 1159F MED LIST DOCD IN RCRD: CPT | Mod: CPTII,,, | Performed by: NURSE PRACTITIONER

## 2023-10-02 NOTE — LETTER
AUTHORIZATION FOR RELEASE OF   CONFIDENTIAL INFORMATION    Dear Dr. Corrine Hu,  FAX: [phone]    We are seeing Renée Ortiz, date of birth 2000, in the clinic at [facility]. Kong Bautista FNP is the patient's PCP. Renée Ortiz has an outstanding lab/procedure at the time we reviewed her chart. In order to help keep her health information updated, she has authorized us to request the following medical record(s):        (  )  MAMMOGRAM                                      (  )  COLONOSCOPY      ( X )  PAP SMEAR                                          (  )  OUTSIDE LAB RESULTS     (  )  DEXA SCAN                                          (  )  EYE EXAM            (  )  FOOT EXAM                                          (  )  ENTIRE RECORD     (  )  OUTSIDE IMMUNIZATIONS                 (  )  _______________         Please fax records to [facility], Rami, Cheramie W, FNP, [phone]       If you have any questions, please contact  at [phone]             Patient Name: Renée Ortiz  : 2000  Patient Phone #: [phone]

## 2023-10-02 NOTE — LETTER
This communication is flagged as high priority.  4th Record Request       AUTHORIZATION FOR RELEASE OF   CONFIDENTIAL INFORMATION    Dear Dr. Corrine Hu,  FAX: [phone] ,    We are seeing Renée Ortiz, date of birth 2000, in the clinic at [facility]. Kong Bautista FNP is the patient's PCP. Renée Ortiz has an outstanding lab/procedure at the time we reviewed her chart. In order to help keep her health information updated, she has authorized us to request the following medical record(s):             ( X )  PAP SMEAR                                                  Please fax records to [facility], Rami, Cheramie W, FNP, [phone]       If you have any questions, please contact  at [phone]            Patient Name: Renée Ortiz  : 2000  Patient Phone #: [phone]

## 2023-10-02 NOTE — LETTER
AUTHORIZATION FOR RELEASE OF   CONFIDENTIAL INFORMATION    Dear Dr. Corrine Hu/  [facility] in [place]   Phone [phone]   Fax: [phone]       We are seeing Renée Ortiz, date of birth 2000, in the clinic at [facility]. Kong Bautista FNP is the patient's PCP. Renée Ortiz has an outstanding lab/procedure at the time we reviewed her chart. In order to help keep her health information updated, she has authorized us to request the following medical record(s):             ( X )  PAP SMEAR               *Please  see routed patient electronically signed clinic authorization form for release of information.                                      Please fax records to [facility], Rami, Cheramie W, FNP, [phone]       If you have any questions, please contact  at [phone]            Patient Name: Renée Ortiz  : 2000  Patient Phone #: [phone]

## 2023-10-02 NOTE — PROGRESS NOTES
Population Health Outreach.    Record request sent for cervical screening [facility] Cor[facility] in [place] [phone]  phone

## 2023-10-02 NOTE — LETTER
This communication is flagged as high priority.        AUTHORIZATION FOR RELEASE OF   CONFIDENTIAL INFORMATION    Dear Corrine Hu, LUC @[facility] ([facility],    We are seeing Renée Ortiz, date of birth 2000, in the clinic at [facility]. Kong Bautista FNP is the patient's PCP. Renée Ortiz has an outstanding lab/procedure at the time we reviewed her chart. In order to help keep her health information updated, she has authorized us to request the following medical record(s):        (  )  MAMMOGRAM                                      (  )  COLONOSCOPY      ( X )  PAP SMEAR                                          (  )  OUTSIDE LAB RESULTS     (  )  DEXA SCAN                                          (  )  EYE EXAM            (  )  FOOT EXAM                                          (  )  ENTIRE RECORD     (  )  OUTSIDE IMMUNIZATIONS                 (  )  _______________         Please fax records to [facility], Rami, Cheramie W, FNP, [phone]       If you have any questions, please contact  at [phone]            Patient Name: Renée Ortiz  : 2000  Patient Phone #: [phone]

## 2023-10-03 LAB
HSV1 IGG SERPL QL IA: NEGATIVE
HSV2 IGG SERPL QL IA: NEGATIVE

## 2023-10-04 ENCOUNTER — CLINICAL SUPPORT (OUTPATIENT)
Dept: AUDIOLOGY | Facility: HOSPITAL | Age: 23
End: 2023-10-04
Payer: MEDICAID

## 2023-10-04 DIAGNOSIS — H91.90 HEARING DISORDER, UNSPECIFIED LATERALITY: Primary | ICD-10-CM

## 2023-10-04 DIAGNOSIS — H91.90 DECREASED HEARING, UNSPECIFIED LATERALITY: ICD-10-CM

## 2023-10-04 PROCEDURE — 92557 COMPREHENSIVE HEARING TEST: CPT | Performed by: AUDIOLOGIST

## 2023-10-04 PROCEDURE — 92567 TYMPANOMETRY: CPT | Performed by: AUDIOLOGIST

## 2023-10-04 NOTE — PROGRESS NOTES
Hearing Evaluation      Patient History: Patient is a 22 y.o. female in for a hearing evaluation reporting decreased hearing secondary to tinnitus with associated aural fullness.  Vertigo and middle ear issues are denied. All additional history is unremarkable.       Test Results:       Pure Tone Testing:     Right ear:   Normal peripheral hearing sensitivity from 250-8kHz. Speech reception threshold is in agreement with puretone findings.  Discrimination score of 100% is considered excellent.      Left ear: Normal peripheral hearing sensitivity from 250-8kHz. Speech reception threshold is in agreement with puretone findings.  Discrimination score of 100% is considered excellent.         Tympanometry:        Right ear:   Type 'A' tymp, normal middle ear pressure/function    Left ear: Type 'A' tymp, normal middle ear pressure/function       Distortion Product Otoacoustic Emission Testing (DPOAE):         Right ear: Present emissions from 1k-6kHz      Left ear: Present emissions from 1k-6kHz         Interpretations:     Behavioral test findings indicate hearing within normal limits, bilaterally. Speech reception thresholds obtained at 15dB, AU, and are in agreement with puretone findings. Speech discrimination scores of 100%, AU, are considered excellent.  DPOAE findings indicate normal cochlear outer hair cell function, bilaterally. Immittance measures indicate normal middle ear pressure/function, bilaterally.         Recommendations:   RTC PRN with audiology

## 2023-10-13 RX ORDER — FLUCONAZOLE 150 MG/1
150 TABLET ORAL DAILY
Qty: 1 TABLET | Refills: 1 | Status: SHIPPED | OUTPATIENT
Start: 2023-10-13

## 2023-10-13 NOTE — PROGRESS NOTES
Spoke with pt- chest be negative she is still complaining of discomfort in the vaginal area.  States that sometimes she has a discharge sometimes not.  Denies any odor  Patient declines terconazole applicator.  We will send out Diflucan po

## 2023-10-17 ENCOUNTER — PATIENT MESSAGE (OUTPATIENT)
Dept: ADMINISTRATIVE | Facility: HOSPITAL | Age: 23
End: 2023-10-17
Payer: MEDICAID

## 2023-10-26 ENCOUNTER — TELEPHONE (OUTPATIENT)
Dept: FAMILY MEDICINE | Facility: CLINIC | Age: 23
End: 2023-10-26

## 2023-11-15 NOTE — PROGRESS NOTES
Population Health Outreach.    4th record request to previous GYN.     New patient appt with Dr. Geiger 04/2024 per  notes.

## 2023-11-17 ENCOUNTER — DOCUMENTATION ONLY (OUTPATIENT)
Dept: ADMINISTRATIVE | Facility: HOSPITAL | Age: 23
End: 2023-11-17
Payer: MEDICAID

## 2023-11-27 NOTE — PROGRESS NOTES
Population Health Outreach.    Requested PAP -Dr. Corrine Hu - [facility] in [place] Phone [phone] Fax: [phone] with routed signed clinic authorization form for release of information.

## 2023-12-04 ENCOUNTER — OFFICE VISIT (OUTPATIENT)
Dept: FAMILY MEDICINE | Facility: CLINIC | Age: 23
End: 2023-12-04
Payer: MEDICAID

## 2023-12-04 VITALS
BODY MASS INDEX: 37.54 KG/M2 | WEIGHT: 198.81 LBS | HEIGHT: 61 IN | HEART RATE: 81 BPM | SYSTOLIC BLOOD PRESSURE: 124 MMHG | RESPIRATION RATE: 16 BRPM | DIASTOLIC BLOOD PRESSURE: 82 MMHG | OXYGEN SATURATION: 100 % | TEMPERATURE: 98 F

## 2023-12-04 DIAGNOSIS — N91.2 AMENORRHEA, UNSPECIFIED: Primary | ICD-10-CM

## 2023-12-04 PROCEDURE — 3044F HG A1C LEVEL LT 7.0%: CPT | Mod: CPTII,,, | Performed by: NURSE PRACTITIONER

## 2023-12-04 PROCEDURE — 1160F PR REVIEW ALL MEDS BY PRESCRIBER/CLIN PHARMACIST DOCUMENTED: ICD-10-PCS | Mod: CPTII,,, | Performed by: NURSE PRACTITIONER

## 2023-12-04 PROCEDURE — 3074F PR MOST RECENT SYSTOLIC BLOOD PRESSURE < 130 MM HG: ICD-10-PCS | Mod: CPTII,,, | Performed by: NURSE PRACTITIONER

## 2023-12-04 PROCEDURE — 1160F RVW MEDS BY RX/DR IN RCRD: CPT | Mod: CPTII,,, | Performed by: NURSE PRACTITIONER

## 2023-12-04 PROCEDURE — 99213 OFFICE O/P EST LOW 20 MIN: CPT | Mod: ,,, | Performed by: NURSE PRACTITIONER

## 2023-12-04 PROCEDURE — 3079F DIAST BP 80-89 MM HG: CPT | Mod: CPTII,,, | Performed by: NURSE PRACTITIONER

## 2023-12-04 PROCEDURE — 3008F PR BODY MASS INDEX (BMI) DOCUMENTED: ICD-10-PCS | Mod: CPTII,,, | Performed by: NURSE PRACTITIONER

## 2023-12-04 PROCEDURE — 3079F PR MOST RECENT DIASTOLIC BLOOD PRESSURE 80-89 MM HG: ICD-10-PCS | Mod: CPTII,,, | Performed by: NURSE PRACTITIONER

## 2023-12-04 PROCEDURE — 1159F MED LIST DOCD IN RCRD: CPT | Mod: CPTII,,, | Performed by: NURSE PRACTITIONER

## 2023-12-04 PROCEDURE — 3008F BODY MASS INDEX DOCD: CPT | Mod: CPTII,,, | Performed by: NURSE PRACTITIONER

## 2023-12-04 PROCEDURE — 1159F PR MEDICATION LIST DOCUMENTED IN MEDICAL RECORD: ICD-10-PCS | Mod: CPTII,,, | Performed by: NURSE PRACTITIONER

## 2023-12-04 PROCEDURE — 3044F PR MOST RECENT HEMOGLOBIN A1C LEVEL <7.0%: ICD-10-PCS | Mod: CPTII,,, | Performed by: NURSE PRACTITIONER

## 2023-12-04 PROCEDURE — 3074F SYST BP LT 130 MM HG: CPT | Mod: CPTII,,, | Performed by: NURSE PRACTITIONER

## 2023-12-04 PROCEDURE — 99213 PR OFFICE/OUTPT VISIT, EST, LEVL III, 20-29 MIN: ICD-10-PCS | Mod: ,,, | Performed by: NURSE PRACTITIONER

## 2023-12-04 NOTE — PROGRESS NOTES
SUBJECTIVE:     History of Present Illness      Chief Complaint: Follow-up (2 month follow up visit to discuss lab results.  C/O late menses, 08/17/2023.  No complaints at this time.  )    HPI:  Patient is a 22 y.o. year old female who presents to clinic for follow-up.  Last menstrual three-month ago.  Urine pregnancy test completed in office negative  Patient reports that she is been under lot of stress due to recently moving.  She states at the soreness to her vaginal area has resolved.  She has been having increased vaginal discharge clear in.  Patient denies any older itching or irritation.  Review of Systems:    Review of Systems    12 point review of systems conducted, negative except as stated in the history of present illness. See HPI for details.     Previous History    Kong Bautista, ROMMELP  Review of patient's allergies indicates:   Allergen Reactions    Lavender Hives, Itching and Shortness Of Breath    Cat dander Hives and Itching       Past Medical History:   Diagnosis Date    Anemia, unspecified     Asthma     Iron deficiency anemia, unspecified      Current Outpatient Medications   Medication Instructions    budesonide-formoterol 160-4.5 mcg (SYMBICORT) 160-4.5 mcg/actuation HFAA No dose, route, or frequency recorded.    cetirizine (ZYRTEC) 5 mg, Oral, Daily    fluconazole (DIFLUCAN) 150 mg, Oral, Daily, Repeat dosing after 72 hours if no relief in symptoms.    FLUoxetine 10 mg, Oral, Daily    fluticasone propionate (FLONASE) 50 mcg, Each Nostril, Daily    iron,carbonyl/ascorbic acid (VITRON-C ORAL) No dose, route, or frequency recorded.    loratadine (CLARITIN) 10 mg tablet No dose, route, or frequency recorded.     History reviewed. No pertinent surgical history.  Family History   Problem Relation Age of Onset    Diabetes Mother     Asthma Mother        Social History     Tobacco Use    Smoking status: Never    Smokeless tobacco: Never   Substance Use Topics    Alcohol use: Never    Drug use:  "Never        Health Maintenance      Health Maintenance   Topic Date Due    Hepatitis C Screening  Never done    TETANUS VACCINE  Never done    Pap Smear  Never done    HPV Vaccines (3 - 3-dose series) 05/18/2023    Chlamydia Screening  10/02/2024    Lipid Panel  Completed       OBJECTIVE:     Physical Exam      Vital Signs Reviewed   Visit Vitals  /82 (BP Location: Left arm, Patient Position: Sitting)   Pulse 81   Temp 98.3 °F (36.8 °C) (Oral)   Resp 16   Ht 5' 1" (1.549 m)   Wt 90.2 kg (198 lb 12.8 oz)   LMP 08/17/2023 (Approximate)   SpO2 100%   BMI 37.56 kg/m²       Physical Exam    Physical Exam:  General: Alert, well nourished, no acute distress, non-toxic appearing.   Eyes: Anicteric sclera, without conjunctival injection, normal lids, no purulent drainage, EOMs grossly intact.   Ears: No tragal tenderness. Tympanic membranes intact, pearly grey, without effusion or erythema and with a positive light reflex.   Mouth: Posterior pharynx without erythema. No exudate, ulcerations, or lesion. No tonsillar swelling.   Neck: Supple, full ROM, no rigidity, no cervical adenopathy.   Cardio: Normal rate and rhythm    Resp: Respirations even and unlabored, clear to auscultation bilaterally.   Abd: No ecchymosis or distension. Normal bowel sounds in all 4 quadrants. No tenderness to palpation. No rebound tenderness or guarding. No CVA tenderness.   Skin: No rashes or open lesions noted.   MSK: No swelling. No abrasions or signs of trauma. Ambulating without assistance.   Neuro: Alert,oriented No focal deficits noted. Facial expressions even.   Psych: Cooperative, Normal affect      Procedures    Procedures     Labs     Results for orders placed or performed in visit on 10/02/23   HIV 1/2 Ag/Ab (4th Gen)   Result Value Ref Range    HIV Nonreactive Nonreactive   HSV 1 & 2, IgG   Result Value Ref Range    HSV Type 1 Ab, IgG, S Negative Negative    HSV Type 2 Ab, IgG, S Negative Negative       Chemistry:  Lab Results "   Component Value Date     (L) 09/30/2023    K 4.2 09/30/2023    CHLORIDE 102 09/30/2023    BUN 10.0 09/30/2023    CREATININE 0.83 09/30/2023    EGFRNORACEVR >60 09/30/2023    GLUCOSE 85 09/30/2023    CALCIUM 9.3 09/30/2023    ALKPHOS 89 09/30/2023    LABPROT 8.5 (H) 09/30/2023    ALBUMIN 4.1 09/30/2023    AST 23 09/30/2023    ALT 16 09/30/2023    TSH 1.344 09/30/2023        Lab Results   Component Value Date    HGBA1C 5.2 09/30/2023        Hematology:  Lab Results   Component Value Date    WBC 6.77 09/30/2023    HGB 12.9 09/30/2023    HCT 43.3 09/30/2023     09/30/2023       Lipid Panel:  Lab Results   Component Value Date    CHOL 171 09/30/2023    HDL 45 09/30/2023    .00 09/30/2023    TRIG 54 09/30/2023    TOTALCHOLEST 4 09/30/2023        Urine:  Lab Results   Component Value Date    COLORUA Yellow 09/30/2023    APPEARANCEUA Clear 09/30/2023    SGUA 1.020 09/30/2023    PHUA 7.0 09/30/2023    PROTEINUA Negative 09/30/2023    GLUCOSEUA Negative 09/30/2023    KETONESUA Negative 09/30/2023    BLOODUA Trace-Intact (A) 09/30/2023    NITRITESUA Negative 09/30/2023    LEUKOCYTESUR Negative 09/30/2023    RBCUA None Seen 09/30/2023    WBCUA None Seen 09/30/2023    BACTERIA None Seen 09/30/2023         Assessment            ICD-10-CM ICD-9-CM   1. Amenorrhea, unspecified  N91.2 626.0       Plan       1. Amenorrhea, unspecified  UPT_ Negative   Recommend patient follow up with gyn      Medication List with Changes/Refills   Current Medications    BUDESONIDE-FORMOTEROL 160-4.5 MCG (SYMBICORT) 160-4.5 MCG/ACTUATION HFAA        CETIRIZINE (ZYRTEC) 5 MG TABLET    Take 1 tablet (5 mg total) by mouth once daily.    FLUCONAZOLE (DIFLUCAN) 150 MG TAB    Take 1 tablet (150 mg total) by mouth once daily. Repeat dosing after 72 hours if no relief in symptoms.    FLUOXETINE 10 MG CAPSULE    Take 1 capsule (10 mg total) by mouth once daily.    FLUTICASONE PROPIONATE (FLONASE) 50 MCG/ACTUATION NASAL SPRAY    1 spray  (50 mcg total) by Each Nostril route once daily.    IRON,CARBONYL/ASCORBIC ACID (VITRON-C ORAL)        LORATADINE (CLARITIN) 10 MG TABLET           No follow-ups on file.   No follow-ups on file. In addition to their scheduled follow up, the patient has also been instructed to follow up on as needed basis.   Future Appointments   Date Time Provider Department Center   4/1/2024 10:30 AM Duncan Geiger MD [facility]   10/8/2024  9:00 AM Kong Bautista, P [facility]

## 2024-01-11 ENCOUNTER — PATIENT OUTREACH (OUTPATIENT)
Dept: ADMINISTRATIVE | Facility: HOSPITAL | Age: 24
End: 2024-01-11
Payer: MEDICAID

## 2024-01-11 NOTE — LETTER
AUTHORIZATION FOR RELEASE OF   CONFIDENTIAL INFORMATION    Dear -Dr. Corrine Hu - [facility] in [place]   Phone [phone] Fax: [phone] ,    We are seeing Renée Ortiz, date of birth 2000, in the clinic at [facility]. Kong Bautista FNP is the patient's PCP. Renée Ortiz has an outstanding lab/procedure at the time we reviewed her chart. In order to help keep her health information updated, she has authorized us to request the following medical record(s):         ( X )  PAP SMEAR          *Please see routed and signed electronic patient signature to authorize release of information.                                             Please fax records to [facility], Rami, Cheramie W, FNP, [phone]       If you have any questions, please contact  at [phone]            Patient Name: Renée Ortiz  : 2000  Patient Phone #: [phone]

## 2024-01-11 NOTE — PROGRESS NOTES
Population Health Outreach.    -Dr. Corrine Hu - [facility] in [place] Phone [phone] Fax: [phone]   Re-requested PAP with Signed electronic patient release of information form.     Patient also has appointment with Dr. Geiger April 2024

## 2024-06-11 DIAGNOSIS — Z12.4 ENCOUNTER FOR SCREENING FOR CERVICAL CANCER: Primary | ICD-10-CM

## 2024-06-14 ENCOUNTER — HOSPITAL ENCOUNTER (EMERGENCY)
Facility: HOSPITAL | Age: 24
Discharge: HOME | End: 2024-06-14
Attending: SPECIALIST
Payer: MEDICAID

## 2024-06-14 VITALS
DIASTOLIC BLOOD PRESSURE: 85 MMHG | OXYGEN SATURATION: 99 % | TEMPERATURE: 98 F | HEART RATE: 78 BPM | HEIGHT: 61 IN | SYSTOLIC BLOOD PRESSURE: 154 MMHG | BODY MASS INDEX: 36.63 KG/M2 | WEIGHT: 194 LBS | RESPIRATION RATE: 18 BRPM

## 2024-06-14 DIAGNOSIS — R07.89 CHEST WALL PAIN: Primary | ICD-10-CM

## 2024-06-14 DIAGNOSIS — R10.9 ABDOMINAL WALL PAIN: ICD-10-CM

## 2024-06-14 LAB — B-HCG UR QL: NEGATIVE

## 2024-06-14 PROCEDURE — 81025 URINE PREGNANCY TEST: CPT | Performed by: SPECIALIST

## 2024-06-14 PROCEDURE — 25000003 PHARM REV CODE 250: Performed by: SPECIALIST

## 2024-06-14 PROCEDURE — 99284 EMERGENCY DEPT VISIT MOD MDM: CPT | Mod: 25

## 2024-06-14 RX ORDER — KETOROLAC TROMETHAMINE 10 MG/1
10 TABLET, FILM COATED ORAL
Status: COMPLETED | OUTPATIENT
Start: 2024-06-14 | End: 2024-06-14

## 2024-06-14 RX ORDER — NABUMETONE 500 MG/1
500 TABLET, FILM COATED ORAL 2 TIMES DAILY PRN
Qty: 20 TABLET | Refills: 0 | Status: SHIPPED | OUTPATIENT
Start: 2024-06-14

## 2024-06-14 RX ADMIN — KETOROLAC TROMETHAMINE 10 MG: 10 TABLET, FILM COATED ORAL at 07:06

## 2024-06-15 NOTE — ED PROVIDER NOTES
Encounter Date: 6/14/2024       History     Chief Complaint   Patient presents with    Fall     Slipped and Fell on 6/11 in bathroom. She hit upper abdomen and right upper arm on ledge of tub. She complains of pain in these areas.  She denies hitting head or loc.      Patient is a 23-year-old female who slipped and fell in the bathtub hitting her upper abdomen and lower chest area 3 days ago; complains of pain, no shortness of breath; also hitting her right upper arm and has a contusion to the triceps area otherwise no deformity    The history is provided by the patient.     Review of patient's allergies indicates:   Allergen Reactions    Lavender Hives, Itching and Shortness Of Breath    Cat dander Hives and Itching    Lavender oil Itching     Lilac    Lactose Other (See Comments)     Stomach upset, diarrhea   Stomach upset, diarrhea     Past Medical History:   Diagnosis Date    Anemia, unspecified     Asthma     Iron deficiency anemia, unspecified      No past surgical history on file.  Family History   Problem Relation Name Age of Onset    Diabetes Mother      Asthma Mother       Social History     Tobacco Use    Smoking status: Never    Smokeless tobacco: Never   Substance Use Topics    Alcohol use: Never    Drug use: Never     Review of Systems   Constitutional: Negative.    HENT: Negative.     Respiratory: Negative.     Cardiovascular: Negative.    Gastrointestinal: Negative.    Musculoskeletal: Negative.    Skin: Negative.    Neurological: Negative.    All other systems reviewed and are negative.      Physical Exam     Initial Vitals [06/14/24 1806]   BP Pulse Resp Temp SpO2   (!) 154/85 78 18 98.4 °F (36.9 °C) 99 %      MAP       --         Physical Exam    Nursing note and vitals reviewed.  Constitutional: She appears well-developed and well-nourished.   HENT:   Head: Normocephalic and atraumatic.   Eyes: EOM are normal. Pupils are equal, round, and reactive to light.   Neck: Neck supple.   Normal range of  motion.  Cardiovascular:  Normal rate, regular rhythm, normal heart sounds and intact distal pulses.           Pulmonary/Chest: Breath sounds normal. She exhibits tenderness (mild to the lower chest area, no ecchymosis or deformity).   Abdominal: Abdomen is soft. She exhibits no distension. There is no abdominal tenderness.   No bruising There is no rebound.   Musculoskeletal:         General: Normal range of motion.      Cervical back: Normal range of motion and neck supple.      Comments: Mild contusion right tricep area     Neurological: She is alert and oriented to person, place, and time. She has normal strength.   Skin: Skin is warm and dry.         ED Course   Procedures  Labs Reviewed   PREGNANCY TEST, URINE RAPID - Normal          Imaging Results              X-Ray Abdomen AP 1 View (KUB) (Preliminary result)  Result time 06/14/24 19:06:11      Wet Read by Aleksandar Marin MD (06/14/24 19:06:11, [facility], Emergency Medicine)    Nonspecific bowel gas pattern                                     X-Ray Chest AP Portable (In process)  Result time 06/14/24 19:10:18      Wet Read by Aleksandar Marin MD (06/14/24 19:07:00, [facility], Emergency Medicine)    No acute abnormality                                     Medications   ketorolac tablet 10 mg (has no administration in time range)     Medical Decision Making  Patient is a 23-year-old female who slipped and fell in the bathtub hitting her upper abdomen and lower chest area 3 days ago; complains of pain, no shortness of breath; also hitting her right upper arm and has a contusion to the triceps area otherwise no deformity    DIFFERENTIAL DIAGNOSIS- contusion, strain, fracture    Amount and/or Complexity of Data Reviewed  Radiology: ordered and independent interpretation performed. Decision-making details documented in ED Course.    Risk  Prescription drug management.  Risk Details: X-rays unremarkable; patient  will be given Toradol emergency room and a prescription for Relafen 500 mg twice a day as needed for pain                                The patient is resting comfortably and in no acute distress.  She states that her symptoms have improved after treatment in Emergency Department. I personally discussed her test results and treatment plan.  Gave strict ED precautions.  Specific conditions for return to the emergency department and importance of follow up with her primary care provided or the physician listed on the discharge instructions.  Patient voices understanding and agrees to the plan discussed. All patients' questions have been answered at this time.   She has remained hemodynamically stable throughout entire stay in ED and is stable for discharge home.      Clinical Impression:  Final diagnoses:  [R10.9] Abdominal wall pain  [R07.89] Chest wall pain (Primary)          ED Disposition Condition    Discharge Stable          ED Prescriptions       Medication Sig Dispense Start Date End Date Auth. Provider    nabumetone (RELAFEN) 500 MG tablet Take 1 tablet (500 mg total) by mouth 2 (two) times daily as needed for Pain. 20 tablet 6/14/2024 -- Aleksandar Marin MD          Follow-up Information       Follow up With Specialties Details Why Contact Info    Kong Bautista, P Family Medicine In 3 days As needed [address]  [facility] [address]  [phone]               Aleksandar Marin MD  06/14/24 3951

## 2024-09-24 DIAGNOSIS — Z00.00 WELLNESS EXAMINATION: Primary | ICD-10-CM

## 2024-09-24 DIAGNOSIS — D50.8 OTHER IRON DEFICIENCY ANEMIA: ICD-10-CM

## 2024-10-01 ENCOUNTER — TELEPHONE (OUTPATIENT)
Dept: FAMILY MEDICINE | Facility: CLINIC | Age: 24
End: 2024-10-01
Payer: MEDICAID

## 2024-10-20 ENCOUNTER — HOSPITAL ENCOUNTER (EMERGENCY)
Facility: HOSPITAL | Age: 24
Discharge: HOME | End: 2024-10-20
Attending: SPECIALIST
Payer: MEDICAID

## 2024-10-20 VITALS
RESPIRATION RATE: 18 BRPM | OXYGEN SATURATION: 99 % | DIASTOLIC BLOOD PRESSURE: 87 MMHG | WEIGHT: 190 LBS | HEART RATE: 96 BPM | HEIGHT: 61 IN | BODY MASS INDEX: 35.87 KG/M2 | TEMPERATURE: 99 F | SYSTOLIC BLOOD PRESSURE: 138 MMHG

## 2024-10-20 DIAGNOSIS — K64.4 EXTERNAL HEMORRHOID: Primary | ICD-10-CM

## 2024-10-20 PROCEDURE — 99283 EMERGENCY DEPT VISIT LOW MDM: CPT

## 2024-10-21 NOTE — ED NOTES
Patient's significant other at bedside. He asked if a female doctor would be performing exam. Informed him we do not have a female physician. Significant other refused pt to have exam. Educated pt on purpose & importance of exam. Pt looks at bf for decision; he declined.

## 2024-10-21 NOTE — ED PROVIDER NOTES
Encounter Date: 10/20/2024       History     Chief Complaint   Patient presents with    Rectal Problems     PT states she had to strain hard to have a bowel movement last week. Later in the week she noticed a fishy smell coming from her rectum along with some yellowish discharge. She had her significant other look and noticed a lump.      23-year-old female reports a bump on her anus, has been straining on the commode recently; mildly tender    The history is provided by the patient.     Review of patient's allergies indicates:   Allergen Reactions    Lavender Hives, Itching and Shortness Of Breath    Cat dander Hives and Itching    Lavender oil Itching     Lilac    Lactose Other (See Comments)     Stomach upset, diarrhea   Stomach upset, diarrhea     Past Medical History:   Diagnosis Date    Anemia, unspecified     Asthma     Iron deficiency anemia, unspecified      No past surgical history on file.  Family History   Problem Relation Name Age of Onset    Diabetes Mother      Asthma Mother       Social History     Tobacco Use    Smoking status: Never    Smokeless tobacco: Never   Substance Use Topics    Alcohol use: Never    Drug use: Never     Review of Systems   Constitutional: Negative.    HENT: Negative.     Respiratory: Negative.     Cardiovascular: Negative.    Gastrointestinal: Negative.    Musculoskeletal: Negative.    Skin: Negative.    Neurological: Negative.    All other systems reviewed and are negative.      Physical Exam     Initial Vitals [10/20/24 2058]   BP Pulse Resp Temp SpO2   138/87 96 18 98.5 °F (36.9 °C) 99 %      MAP       --       NURSING STAFF PRESENT FOR HISTORY AND EXAM  Physical Exam    Constitutional: She appears well-developed and well-nourished.   Eyes: Conjunctivae are normal.   Neck:   Normal range of motion.  Cardiovascular:  Normal rate and regular rhythm.           Pulmonary/Chest: No respiratory distress.   Abdominal: There is no abdominal tenderness.   Patient refused exam of  her anus and rectum There is no rebound.   Musculoskeletal:      Cervical back: Normal range of motion.     Neurological: She is alert and oriented to person, place, and time. She has normal strength. GCS score is 15. GCS eye subscore is 4. GCS verbal subscore is 5. GCS motor subscore is 6.         ED Course   Procedures  Labs Reviewed - No data to display       Imaging Results    None          Medications - No data to display  Medical Decision Making  Differential diagnosis-hemorrhoid, abscess    Risk  Prescription drug management.  Risk Details: This is most likely hemorrhoid although patient refused examination even though nurse was present; will prescribe Proctofoam and discussed warm Sitz baths                                      Clinical Impression:  Final diagnoses:  [K64.4] External hemorrhoid (Primary)          ED Disposition Condition    Discharge Stable          ED Prescriptions       Medication Sig Dispense Start Date End Date Auth. Provider    hydrocortisone-pramoxine (PROCTOFOAM-HS) rectal foam Place 1 applicator rectally 2 (two) times daily. 10 g 10/20/2024 -- Aleksandar Marin MD          Follow-up Information       Follow up With Specialties Details Why Contact Info    Kong Bautista, FNP Family Medicine In 1 day As needed [address]  [facility] [address]  [phone]               Aleksandar Marin MD  10/20/24 2062

## 2025-04-10 ENCOUNTER — PATIENT MESSAGE (OUTPATIENT)
Facility: CLINIC | Age: 25
End: 2025-04-10
Payer: MEDICAID